# Patient Record
Sex: FEMALE | Race: WHITE | NOT HISPANIC OR LATINO | Employment: OTHER | URBAN - METROPOLITAN AREA
[De-identification: names, ages, dates, MRNs, and addresses within clinical notes are randomized per-mention and may not be internally consistent; named-entity substitution may affect disease eponyms.]

---

## 2018-01-01 ENCOUNTER — ANESTHESIA EVENT (OUTPATIENT)
Dept: SURGERY | Facility: HOSPITAL | Age: 83
DRG: 470 | End: 2018-01-01
Payer: MEDICARE

## 2018-01-01 ENCOUNTER — HOSPITAL ENCOUNTER (INPATIENT)
Facility: HOSPITAL | Age: 83
LOS: 5 days | Discharge: NURSING, REHAB OR LONG-TERM CARE | DRG: 470 | End: 2018-09-25
Attending: EMERGENCY MEDICINE | Admitting: INTERNAL MEDICINE
Payer: MEDICARE

## 2018-01-01 ENCOUNTER — ANESTHESIA (OUTPATIENT)
Dept: SURGERY | Facility: HOSPITAL | Age: 83
DRG: 470 | End: 2018-01-01
Payer: MEDICARE

## 2018-01-01 ENCOUNTER — HOSPITAL ENCOUNTER (EMERGENCY)
Facility: HOSPITAL | Age: 83
Discharge: OTHER INSTITUTION | End: 2018-06-15
Attending: EMERGENCY MEDICINE
Payer: MEDICARE

## 2018-01-01 ENCOUNTER — HOSPITAL ENCOUNTER (EMERGENCY)
Facility: HOSPITAL | Age: 83
End: 2018-11-19
Attending: EMERGENCY MEDICINE
Payer: MEDICARE

## 2018-01-01 ENCOUNTER — PATIENT OUTREACH (OUTPATIENT)
Dept: ADMINISTRATIVE | Facility: CLINIC | Age: 83
End: 2018-01-01

## 2018-01-01 VITALS
BODY MASS INDEX: 18.36 KG/M2 | RESPIRATION RATE: 17 BRPM | HEIGHT: 63 IN | DIASTOLIC BLOOD PRESSURE: 67 MMHG | HEART RATE: 66 BPM | WEIGHT: 103.63 LBS | OXYGEN SATURATION: 94 % | SYSTOLIC BLOOD PRESSURE: 148 MMHG | TEMPERATURE: 99 F

## 2018-01-01 VITALS
OXYGEN SATURATION: 99 % | HEART RATE: 86 BPM | BODY MASS INDEX: 18.54 KG/M2 | SYSTOLIC BLOOD PRESSURE: 173 MMHG | WEIGHT: 100.75 LBS | DIASTOLIC BLOOD PRESSURE: 77 MMHG | TEMPERATURE: 98 F | HEIGHT: 62 IN | RESPIRATION RATE: 16 BRPM

## 2018-01-01 VITALS
OXYGEN SATURATION: 72 % | BODY MASS INDEX: 14.8 KG/M2 | SYSTOLIC BLOOD PRESSURE: 71 MMHG | RESPIRATION RATE: 30 BRPM | WEIGHT: 83.56 LBS | DIASTOLIC BLOOD PRESSURE: 44 MMHG

## 2018-01-01 DIAGNOSIS — F02.81 ALZHEIMER'S DEMENTIA WITH BEHAVIORAL DISTURBANCE, UNSPECIFIED TIMING OF DEMENTIA ONSET: Primary | ICD-10-CM

## 2018-01-01 DIAGNOSIS — Z96.649 S/P HIP HEMIARTHROPLASTY: Primary | ICD-10-CM

## 2018-01-01 DIAGNOSIS — M54.9 BACK PAIN: ICD-10-CM

## 2018-01-01 DIAGNOSIS — R09.2 RESPIRATORY ARREST: Primary | ICD-10-CM

## 2018-01-01 DIAGNOSIS — M25.569 KNEE PAIN: ICD-10-CM

## 2018-01-01 DIAGNOSIS — I10 ESSENTIAL (PRIMARY) HYPERTENSION: ICD-10-CM
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
## 2018-01-01 DIAGNOSIS — G30.9 ALZHEIMER'S DEMENTIA WITH BEHAVIORAL DISTURBANCE, UNSPECIFIED TIMING OF DEMENTIA ONSET: Primary | ICD-10-CM

## 2018-01-01 DIAGNOSIS — I46.9 CARDIAC ARREST: ICD-10-CM

## 2018-01-01 DIAGNOSIS — S72.002A CLOSED FRACTURE OF LEFT HIP: ICD-10-CM

## 2018-01-01 DIAGNOSIS — M25.559 HIP PAIN: ICD-10-CM

## 2018-01-01 DIAGNOSIS — N39.0 CHRONIC UTI: ICD-10-CM

## 2018-01-01 DIAGNOSIS — S72.002A CLOSED FRACTURE OF LEFT HIP, INITIAL ENCOUNTER: ICD-10-CM

## 2018-01-01 DIAGNOSIS — I10 HYPERTENSION, UNSPECIFIED TYPE: ICD-10-CM

## 2018-01-01 DIAGNOSIS — T17.908A ASPIRATION INTO AIRWAY, INITIAL ENCOUNTER: ICD-10-CM

## 2018-01-01 DIAGNOSIS — R45.1 AGITATION: ICD-10-CM

## 2018-01-01 LAB
ABO + RH BLD: NORMAL
ALBUMIN SERPL BCP-MCNC: 3.7 G/DL
ALP SERPL-CCNC: 66 U/L
ALT SERPL W/O P-5'-P-CCNC: 20 U/L
AMPHET+METHAMPHET UR QL: NEGATIVE
ANION GAP SERPL CALC-SCNC: 10 MMOL/L
ANION GAP SERPL CALC-SCNC: 10 MMOL/L
ANION GAP SERPL CALC-SCNC: 11 MMOL/L
ANION GAP SERPL CALC-SCNC: 12 MMOL/L
ANION GAP SERPL CALC-SCNC: 7 MMOL/L
ANION GAP SERPL CALC-SCNC: 8 MMOL/L
APTT BLDCRRT: 25.4 SEC
AST SERPL-CCNC: 23 U/L
BARBITURATES UR QL SCN>200 NG/ML: NEGATIVE
BASOPHILS # BLD AUTO: 0.01 K/UL
BASOPHILS # BLD AUTO: 0.03 K/UL
BASOPHILS # BLD AUTO: 0.03 K/UL
BASOPHILS NFR BLD: 0.1 %
BASOPHILS NFR BLD: 0.2 %
BASOPHILS NFR BLD: 0.5 %
BENZODIAZ UR QL SCN>200 NG/ML: NEGATIVE
BILIRUB SERPL-MCNC: 0.3 MG/DL
BILIRUB UR QL STRIP: NEGATIVE
BILIRUB UR QL STRIP: NEGATIVE
BLD GP AB SCN CELLS X3 SERPL QL: NORMAL
BLD PROD TYP BPU: NORMAL
BLOOD UNIT EXPIRATION DATE: NORMAL
BLOOD UNIT TYPE CODE: 6200
BLOOD UNIT TYPE: NORMAL
BUN SERPL-MCNC: 21 MG/DL
BUN SERPL-MCNC: 22 MG/DL
BUN SERPL-MCNC: 23 MG/DL
BUN SERPL-MCNC: 24 MG/DL
BUN SERPL-MCNC: 35 MG/DL
BZE UR QL SCN: NEGATIVE
CALCIUM SERPL-MCNC: 8.1 MG/DL
CALCIUM SERPL-MCNC: 8.1 MG/DL
CALCIUM SERPL-MCNC: 8.3 MG/DL
CALCIUM SERPL-MCNC: 8.4 MG/DL
CALCIUM SERPL-MCNC: 8.5 MG/DL
CALCIUM SERPL-MCNC: 8.7 MG/DL
CALCIUM SERPL-MCNC: 9.4 MG/DL
CALCIUM SERPL-MCNC: 9.4 MG/DL
CANNABINOIDS UR QL SCN: NEGATIVE
CHLORIDE SERPL-SCNC: 100 MMOL/L
CHLORIDE SERPL-SCNC: 104 MMOL/L
CHLORIDE SERPL-SCNC: 104 MMOL/L
CHLORIDE SERPL-SCNC: 105 MMOL/L
CHLORIDE SERPL-SCNC: 106 MMOL/L
CHLORIDE SERPL-SCNC: 107 MMOL/L
CHLORIDE SERPL-SCNC: 109 MMOL/L
CHLORIDE SERPL-SCNC: 113 MMOL/L
CK SERPL-CCNC: 136 U/L
CLARITY UR: CLEAR
CLARITY UR: CLEAR
CO2 SERPL-SCNC: 18 MMOL/L
CO2 SERPL-SCNC: 19 MMOL/L
CO2 SERPL-SCNC: 19 MMOL/L
CO2 SERPL-SCNC: 20 MMOL/L
CO2 SERPL-SCNC: 21 MMOL/L
CO2 SERPL-SCNC: 23 MMOL/L
CO2 SERPL-SCNC: 23 MMOL/L
CO2 SERPL-SCNC: 24 MMOL/L
CODING SYSTEM: NORMAL
COLOR UR: YELLOW
COLOR UR: YELLOW
CREAT SERPL-MCNC: 0.6 MG/DL
CREAT SERPL-MCNC: 0.7 MG/DL
CREAT SERPL-MCNC: 0.8 MG/DL
CREAT SERPL-MCNC: 0.8 MG/DL
CREAT SERPL-MCNC: 1.3 MG/DL
CREAT UR-MCNC: 167.5 MG/DL
DIFFERENTIAL METHOD: ABNORMAL
DISPENSE STATUS: NORMAL
EOSINOPHIL # BLD AUTO: 0 K/UL
EOSINOPHIL # BLD AUTO: 0.1 K/UL
EOSINOPHIL # BLD AUTO: 0.1 K/UL
EOSINOPHIL # BLD AUTO: 0.2 K/UL
EOSINOPHIL # BLD AUTO: 0.2 K/UL
EOSINOPHIL NFR BLD: 0 %
EOSINOPHIL NFR BLD: 0.1 %
EOSINOPHIL NFR BLD: 0.2 %
EOSINOPHIL NFR BLD: 0.3 %
EOSINOPHIL NFR BLD: 0.8 %
EOSINOPHIL NFR BLD: 0.8 %
EOSINOPHIL NFR BLD: 1.5 %
EOSINOPHIL NFR BLD: 1.9 %
ERYTHROCYTE [DISTWIDTH] IN BLOOD BY AUTOMATED COUNT: 14.8 %
ERYTHROCYTE [DISTWIDTH] IN BLOOD BY AUTOMATED COUNT: 14.8 %
ERYTHROCYTE [DISTWIDTH] IN BLOOD BY AUTOMATED COUNT: 14.9 %
ERYTHROCYTE [DISTWIDTH] IN BLOOD BY AUTOMATED COUNT: 15.1 %
ERYTHROCYTE [DISTWIDTH] IN BLOOD BY AUTOMATED COUNT: 15.2 %
ERYTHROCYTE [DISTWIDTH] IN BLOOD BY AUTOMATED COUNT: 15.2 %
EST. GFR  (AFRICAN AMERICAN): 41 ML/MIN/1.73 M^2
EST. GFR  (AFRICAN AMERICAN): >60 ML/MIN/1.73 M^2
EST. GFR  (NON AFRICAN AMERICAN): 36 ML/MIN/1.73 M^2
EST. GFR  (NON AFRICAN AMERICAN): >60 ML/MIN/1.73 M^2
GLUCOSE SERPL-MCNC: 101 MG/DL
GLUCOSE SERPL-MCNC: 103 MG/DL
GLUCOSE SERPL-MCNC: 129 MG/DL
GLUCOSE SERPL-MCNC: 139 MG/DL
GLUCOSE SERPL-MCNC: 142 MG/DL
GLUCOSE SERPL-MCNC: 156 MG/DL
GLUCOSE SERPL-MCNC: 97 MG/DL
GLUCOSE SERPL-MCNC: 98 MG/DL
GLUCOSE UR QL STRIP: NEGATIVE
GLUCOSE UR QL STRIP: NEGATIVE
HCT VFR BLD AUTO: 26.8 %
HCT VFR BLD AUTO: 28.9 %
HCT VFR BLD AUTO: 30.3 %
HCT VFR BLD AUTO: 31.1 %
HCT VFR BLD AUTO: 31.7 %
HCT VFR BLD AUTO: 33.4 %
HCT VFR BLD AUTO: 37.8 %
HCT VFR BLD AUTO: 38.5 %
HGB BLD-MCNC: 10.1 G/DL
HGB BLD-MCNC: 10.3 G/DL
HGB BLD-MCNC: 10.5 G/DL
HGB BLD-MCNC: 11.1 G/DL
HGB BLD-MCNC: 12.9 G/DL
HGB BLD-MCNC: 13 G/DL
HGB BLD-MCNC: 8.9 G/DL
HGB BLD-MCNC: 9.6 G/DL
HGB UR QL STRIP: ABNORMAL
HGB UR QL STRIP: NEGATIVE
INR PPP: 1.1
KETONES UR QL STRIP: ABNORMAL
KETONES UR QL STRIP: NEGATIVE
LEUKOCYTE ESTERASE UR QL STRIP: NEGATIVE
LEUKOCYTE ESTERASE UR QL STRIP: NEGATIVE
LYMPHOCYTES # BLD AUTO: 0.9 K/UL
LYMPHOCYTES # BLD AUTO: 1 K/UL
LYMPHOCYTES # BLD AUTO: 1 K/UL
LYMPHOCYTES # BLD AUTO: 1.1 K/UL
LYMPHOCYTES # BLD AUTO: 1.2 K/UL
LYMPHOCYTES # BLD AUTO: 1.2 K/UL
LYMPHOCYTES # BLD AUTO: 1.6 K/UL
LYMPHOCYTES # BLD AUTO: 2.1 K/UL
LYMPHOCYTES NFR BLD: 12.7 %
LYMPHOCYTES NFR BLD: 34 %
LYMPHOCYTES NFR BLD: 5.5 %
LYMPHOCYTES NFR BLD: 6.5 %
LYMPHOCYTES NFR BLD: 7.5 %
LYMPHOCYTES NFR BLD: 7.7 %
LYMPHOCYTES NFR BLD: 9.3 %
LYMPHOCYTES NFR BLD: 9.9 %
MAGNESIUM SERPL-MCNC: 1.7 MG/DL
MAGNESIUM SERPL-MCNC: 1.8 MG/DL
MAGNESIUM SERPL-MCNC: 1.9 MG/DL
MCH RBC QN AUTO: 28.5 PG
MCH RBC QN AUTO: 28.6 PG
MCH RBC QN AUTO: 28.7 PG
MCH RBC QN AUTO: 28.7 PG
MCH RBC QN AUTO: 28.8 PG
MCH RBC QN AUTO: 28.9 PG
MCH RBC QN AUTO: 29.4 PG
MCH RBC QN AUTO: 30.4 PG
MCHC RBC AUTO-ENTMCNC: 33.1 G/DL
MCHC RBC AUTO-ENTMCNC: 33.1 G/DL
MCHC RBC AUTO-ENTMCNC: 33.2 G/DL
MCHC RBC AUTO-ENTMCNC: 33.3 G/DL
MCHC RBC AUTO-ENTMCNC: 33.8 G/DL
MCHC RBC AUTO-ENTMCNC: 34.1 G/DL
MCV RBC AUTO: 86 FL
MCV RBC AUTO: 87 FL
MCV RBC AUTO: 87 FL
MCV RBC AUTO: 90 FL
METHADONE UR QL SCN>300 NG/ML: NEGATIVE
MICROSCOPIC COMMENT: ABNORMAL
MONOCYTES # BLD AUTO: 0.5 K/UL
MONOCYTES # BLD AUTO: 0.7 K/UL
MONOCYTES # BLD AUTO: 0.9 K/UL
MONOCYTES # BLD AUTO: 1 K/UL
MONOCYTES # BLD AUTO: 1.1 K/UL
MONOCYTES NFR BLD: 4.6 %
MONOCYTES NFR BLD: 5 %
MONOCYTES NFR BLD: 6.9 %
MONOCYTES NFR BLD: 7 %
MONOCYTES NFR BLD: 7 %
MONOCYTES NFR BLD: 7.4 %
MONOCYTES NFR BLD: 8.5 %
MONOCYTES NFR BLD: 9.3 %
NEUTROPHILS # BLD AUTO: 10.3 K/UL
NEUTROPHILS # BLD AUTO: 10.8 K/UL
NEUTROPHILS # BLD AUTO: 11.9 K/UL
NEUTROPHILS # BLD AUTO: 12.3 K/UL
NEUTROPHILS # BLD AUTO: 16.4 K/UL
NEUTROPHILS # BLD AUTO: 3.5 K/UL
NEUTROPHILS # BLD AUTO: 9.2 K/UL
NEUTROPHILS # BLD AUTO: 9.8 K/UL
NEUTROPHILS NFR BLD: 56.2 %
NEUTROPHILS NFR BLD: 78.3 %
NEUTROPHILS NFR BLD: 78.8 %
NEUTROPHILS NFR BLD: 83.4 %
NEUTROPHILS NFR BLD: 85.2 %
NEUTROPHILS NFR BLD: 86.3 %
NEUTROPHILS NFR BLD: 86.7 %
NEUTROPHILS NFR BLD: 89.4 %
NITRITE UR QL STRIP: NEGATIVE
NITRITE UR QL STRIP: NEGATIVE
NUM UNITS TRANS PACKED RBC: NORMAL
OPIATES UR QL SCN: NEGATIVE
PCP UR QL SCN>25 NG/ML: NEGATIVE
PH UR STRIP: 6 [PH] (ref 5–8)
PH UR STRIP: 7 [PH] (ref 5–8)
PHOSPHATE SERPL-MCNC: 1.9 MG/DL
PHOSPHATE SERPL-MCNC: 1.9 MG/DL
PHOSPHATE SERPL-MCNC: 2.1 MG/DL
PHOSPHATE SERPL-MCNC: 2.4 MG/DL
PHOSPHATE SERPL-MCNC: 2.4 MG/DL
PLATELET # BLD AUTO: 197 K/UL
PLATELET # BLD AUTO: 214 K/UL
PLATELET # BLD AUTO: 221 K/UL
PLATELET # BLD AUTO: 233 K/UL
PLATELET # BLD AUTO: 236 K/UL
PLATELET # BLD AUTO: 245 K/UL
PLATELET # BLD AUTO: 267 K/UL
PLATELET # BLD AUTO: 269 K/UL
PMV BLD AUTO: 10.1 FL
PMV BLD AUTO: 10.2 FL
PMV BLD AUTO: 10.3 FL
PMV BLD AUTO: 10.4 FL
PMV BLD AUTO: 10.5 FL
PMV BLD AUTO: 9.5 FL
PMV BLD AUTO: 9.7 FL
PMV BLD AUTO: 9.8 FL
POTASSIUM SERPL-SCNC: 3.1 MMOL/L
POTASSIUM SERPL-SCNC: 3.3 MMOL/L
POTASSIUM SERPL-SCNC: 3.5 MMOL/L
POTASSIUM SERPL-SCNC: 3.7 MMOL/L
POTASSIUM SERPL-SCNC: 3.8 MMOL/L
POTASSIUM SERPL-SCNC: 3.9 MMOL/L
POTASSIUM SERPL-SCNC: 4 MMOL/L
POTASSIUM SERPL-SCNC: 4.1 MMOL/L
PROT SERPL-MCNC: 6.8 G/DL
PROT UR QL STRIP: ABNORMAL
PROT UR QL STRIP: NEGATIVE
PROTHROMBIN TIME: 10.9 SEC
RBC # BLD AUTO: 3.12 M/UL
RBC # BLD AUTO: 3.36 M/UL
RBC # BLD AUTO: 3.51 M/UL
RBC # BLD AUTO: 3.57 M/UL
RBC # BLD AUTO: 3.66 M/UL
RBC # BLD AUTO: 3.87 M/UL
RBC # BLD AUTO: 4.27 M/UL
RBC # BLD AUTO: 4.39 M/UL
RBC #/AREA URNS HPF: 5 /HPF (ref 0–4)
SALICYLATES SERPL-MCNC: <5 MG/DL
SODIUM SERPL-SCNC: 134 MMOL/L
SODIUM SERPL-SCNC: 135 MMOL/L
SODIUM SERPL-SCNC: 136 MMOL/L
SODIUM SERPL-SCNC: 138 MMOL/L
SODIUM SERPL-SCNC: 138 MMOL/L
SODIUM SERPL-SCNC: 142 MMOL/L
SP GR UR STRIP: 1.02 (ref 1–1.03)
SP GR UR STRIP: >=1.03 (ref 1–1.03)
TOXICOLOGY INFORMATION: NORMAL
TROPONIN I SERPL DL<=0.01 NG/ML-MCNC: <0.006 NG/ML
URN SPEC COLLECT METH UR: ABNORMAL
URN SPEC COLLECT METH UR: ABNORMAL
UROBILINOGEN UR STRIP-ACNC: NEGATIVE EU/DL
UROBILINOGEN UR STRIP-ACNC: NEGATIVE EU/DL
WBC # BLD AUTO: 11.67 K/UL
WBC # BLD AUTO: 12.39 K/UL
WBC # BLD AUTO: 12.49 K/UL
WBC # BLD AUTO: 12.61 K/UL
WBC # BLD AUTO: 13.79 K/UL
WBC # BLD AUTO: 14.24 K/UL
WBC # BLD AUTO: 18.39 K/UL
WBC # BLD AUTO: 6.26 K/UL

## 2018-01-01 PROCEDURE — 97530 THERAPEUTIC ACTIVITIES: CPT

## 2018-01-01 PROCEDURE — 96374 THER/PROPH/DIAG INJ IV PUSH: CPT

## 2018-01-01 PROCEDURE — 0SRS01A REPLACEMENT OF LEFT HIP JOINT, FEMORAL SURFACE WITH METAL SYNTHETIC SUBSTITUTE, UNCEMENTED, OPEN APPROACH: ICD-10-PCS | Performed by: ORTHOPAEDIC SURGERY

## 2018-01-01 PROCEDURE — 96376 TX/PRO/DX INJ SAME DRUG ADON: CPT

## 2018-01-01 PROCEDURE — 25000003 PHARM REV CODE 250: Performed by: PHYSICIAN ASSISTANT

## 2018-01-01 PROCEDURE — 80048 BASIC METABOLIC PNL TOTAL CA: CPT

## 2018-01-01 PROCEDURE — 27201423 OPTIME MED/SURG SUP & DEVICES STERILE SUPPLY: Performed by: ORTHOPAEDIC SURGERY

## 2018-01-01 PROCEDURE — 36415 COLL VENOUS BLD VENIPUNCTURE: CPT

## 2018-01-01 PROCEDURE — 21400001 HC TELEMETRY ROOM

## 2018-01-01 PROCEDURE — 94760 N-INVAS EAR/PLS OXIMETRY 1: CPT

## 2018-01-01 PROCEDURE — 83735 ASSAY OF MAGNESIUM: CPT

## 2018-01-01 PROCEDURE — 63600175 PHARM REV CODE 636 W HCPCS: Performed by: PHYSICIAN ASSISTANT

## 2018-01-01 PROCEDURE — 97802 MEDICAL NUTRITION INDIV IN: CPT

## 2018-01-01 PROCEDURE — 84100 ASSAY OF PHOSPHORUS: CPT

## 2018-01-01 PROCEDURE — 97110 THERAPEUTIC EXERCISES: CPT

## 2018-01-01 PROCEDURE — 25000003 PHARM REV CODE 250: Performed by: INTERNAL MEDICINE

## 2018-01-01 PROCEDURE — 63600175 PHARM REV CODE 636 W HCPCS: Performed by: ORTHOPAEDIC SURGERY

## 2018-01-01 PROCEDURE — C1776 JOINT DEVICE (IMPLANTABLE): HCPCS | Performed by: ORTHOPAEDIC SURGERY

## 2018-01-01 PROCEDURE — 27000221 HC OXYGEN, UP TO 24 HOURS

## 2018-01-01 PROCEDURE — 88311 DECALCIFY TISSUE: CPT | Mod: 26,,, | Performed by: PATHOLOGY

## 2018-01-01 PROCEDURE — 36000710: Performed by: ORTHOPAEDIC SURGERY

## 2018-01-01 PROCEDURE — 92610 EVALUATE SWALLOWING FUNCTION: CPT

## 2018-01-01 PROCEDURE — 85025 COMPLETE CBC W/AUTO DIFF WBC: CPT

## 2018-01-01 PROCEDURE — 97116 GAIT TRAINING THERAPY: CPT

## 2018-01-01 PROCEDURE — 25000003 PHARM REV CODE 250: Performed by: ORTHOPAEDIC SURGERY

## 2018-01-01 PROCEDURE — 96361 HYDRATE IV INFUSION ADD-ON: CPT

## 2018-01-01 PROCEDURE — G8979 MOBILITY GOAL STATUS: HCPCS | Mod: CK

## 2018-01-01 PROCEDURE — 99285 EMERGENCY DEPT VISIT HI MDM: CPT | Mod: 25

## 2018-01-01 PROCEDURE — 85730 THROMBOPLASTIN TIME PARTIAL: CPT

## 2018-01-01 PROCEDURE — 84484 ASSAY OF TROPONIN QUANT: CPT

## 2018-01-01 PROCEDURE — 80307 DRUG TEST PRSMV CHEM ANLYZR: CPT

## 2018-01-01 PROCEDURE — 93010 ELECTROCARDIOGRAM REPORT: CPT | Mod: ,,, | Performed by: INTERNAL MEDICINE

## 2018-01-01 PROCEDURE — 63600175 PHARM REV CODE 636 W HCPCS: Performed by: EMERGENCY MEDICINE

## 2018-01-01 PROCEDURE — 97535 SELF CARE MNGMENT TRAINING: CPT

## 2018-01-01 PROCEDURE — 25000003 PHARM REV CODE 250: Performed by: NURSE PRACTITIONER

## 2018-01-01 PROCEDURE — A4216 STERILE WATER/SALINE, 10 ML: HCPCS | Performed by: ANESTHESIOLOGY

## 2018-01-01 PROCEDURE — 63600175 PHARM REV CODE 636 W HCPCS: Performed by: NURSE PRACTITIONER

## 2018-01-01 PROCEDURE — 81003 URINALYSIS AUTO W/O SCOPE: CPT | Mod: 59

## 2018-01-01 PROCEDURE — 96375 TX/PRO/DX INJ NEW DRUG ADDON: CPT

## 2018-01-01 PROCEDURE — 25000003 PHARM REV CODE 250: Performed by: ANESTHESIOLOGY

## 2018-01-01 PROCEDURE — 97162 PT EVAL MOD COMPLEX 30 MIN: CPT

## 2018-01-01 PROCEDURE — 81000 URINALYSIS NONAUTO W/SCOPE: CPT

## 2018-01-01 PROCEDURE — 37000008 HC ANESTHESIA 1ST 15 MINUTES: Performed by: ORTHOPAEDIC SURGERY

## 2018-01-01 PROCEDURE — 94761 N-INVAS EAR/PLS OXIMETRY MLT: CPT

## 2018-01-01 PROCEDURE — 25000003 PHARM REV CODE 250: Performed by: NURSE ANESTHETIST, CERTIFIED REGISTERED

## 2018-01-01 PROCEDURE — 36000711: Performed by: ORTHOPAEDIC SURGERY

## 2018-01-01 PROCEDURE — 63600175 PHARM REV CODE 636 W HCPCS: Performed by: NURSE ANESTHETIST, CERTIFIED REGISTERED

## 2018-01-01 PROCEDURE — 99283 EMERGENCY DEPT VISIT LOW MDM: CPT

## 2018-01-01 PROCEDURE — 97167 OT EVAL HIGH COMPLEX 60 MIN: CPT

## 2018-01-01 PROCEDURE — 97803 MED NUTRITION INDIV SUBSEQ: CPT

## 2018-01-01 PROCEDURE — 36430 TRANSFUSION BLD/BLD COMPNT: CPT

## 2018-01-01 PROCEDURE — 88305 TISSUE EXAM BY PATHOLOGIST: CPT | Mod: 26,,, | Performed by: PATHOLOGY

## 2018-01-01 PROCEDURE — 86850 RBC ANTIBODY SCREEN: CPT

## 2018-01-01 PROCEDURE — G8988 SELF CARE GOAL STATUS: HCPCS | Mod: CK

## 2018-01-01 PROCEDURE — 85610 PROTHROMBIN TIME: CPT

## 2018-01-01 PROCEDURE — 37000009 HC ANESTHESIA EA ADD 15 MINS: Performed by: ORTHOPAEDIC SURGERY

## 2018-01-01 PROCEDURE — 71000033 HC RECOVERY, INTIAL HOUR: Performed by: ORTHOPAEDIC SURGERY

## 2018-01-01 PROCEDURE — G8978 MOBILITY CURRENT STATUS: HCPCS | Mod: CL

## 2018-01-01 PROCEDURE — 82550 ASSAY OF CK (CPK): CPT

## 2018-01-01 PROCEDURE — 86920 COMPATIBILITY TEST SPIN: CPT

## 2018-01-01 PROCEDURE — 99900038 HC OT GENERIC THERAPY SCREENING (STAT)

## 2018-01-01 PROCEDURE — P9016 RBC LEUKOCYTES REDUCED: HCPCS

## 2018-01-01 PROCEDURE — 92526 ORAL FUNCTION THERAPY: CPT

## 2018-01-01 PROCEDURE — 80053 COMPREHEN METABOLIC PANEL: CPT

## 2018-01-01 PROCEDURE — 88311 DECALCIFY TISSUE: CPT | Performed by: PATHOLOGY

## 2018-01-01 PROCEDURE — G8987 SELF CARE CURRENT STATUS: HCPCS | Mod: CM

## 2018-01-01 PROCEDURE — C9290 INJ, BUPIVACAINE LIPOSOME: HCPCS | Performed by: ORTHOPAEDIC SURGERY

## 2018-01-01 RX ORDER — BUPIVACAINE HYDROCHLORIDE AND EPINEPHRINE 2.5; 5 MG/ML; UG/ML
INJECTION, SOLUTION EPIDURAL; INFILTRATION; INTRACAUDAL; PERINEURAL
Status: DISCONTINUED | OUTPATIENT
Start: 2018-01-01 | End: 2018-01-01 | Stop reason: HOSPADM

## 2018-01-01 RX ORDER — CEPHALEXIN 250 MG/1
250 CAPSULE ORAL DAILY
Status: DISCONTINUED | OUTPATIENT
Start: 2018-01-01 | End: 2018-01-01

## 2018-01-01 RX ORDER — ROCURONIUM BROMIDE 10 MG/ML
INJECTION, SOLUTION INTRAVENOUS
Status: DISCONTINUED | OUTPATIENT
Start: 2018-01-01 | End: 2018-01-01

## 2018-01-01 RX ORDER — VANCOMYCIN HYDROCHLORIDE 1 G/20ML
INJECTION, POWDER, LYOPHILIZED, FOR SOLUTION INTRAVENOUS
Status: DISCONTINUED | OUTPATIENT
Start: 2018-01-01 | End: 2018-01-01 | Stop reason: HOSPADM

## 2018-01-01 RX ORDER — CETIRIZINE HYDROCHLORIDE 10 MG/1
10 TABLET ORAL DAILY
Status: DISCONTINUED | OUTPATIENT
Start: 2018-01-01 | End: 2018-01-01 | Stop reason: HOSPADM

## 2018-01-01 RX ORDER — MORPHINE SULFATE 4 MG/ML
2 INJECTION, SOLUTION INTRAMUSCULAR; INTRAVENOUS EVERY 4 HOURS PRN
Status: DISCONTINUED | OUTPATIENT
Start: 2018-01-01 | End: 2018-01-01

## 2018-01-01 RX ORDER — MORPHINE SULFATE 4 MG/ML
2 INJECTION, SOLUTION INTRAMUSCULAR; INTRAVENOUS
Status: COMPLETED | OUTPATIENT
Start: 2018-01-01 | End: 2018-01-01

## 2018-01-01 RX ORDER — SUCCINYLCHOLINE CHLORIDE 20 MG/ML
INJECTION INTRAMUSCULAR; INTRAVENOUS
Status: DISCONTINUED | OUTPATIENT
Start: 2018-01-01 | End: 2018-01-01

## 2018-01-01 RX ORDER — MORPHINE SULFATE 4 MG/ML
2 INJECTION, SOLUTION INTRAMUSCULAR; INTRAVENOUS EVERY 6 HOURS PRN
Status: DISCONTINUED | OUTPATIENT
Start: 2018-01-01 | End: 2018-01-01 | Stop reason: HOSPADM

## 2018-01-01 RX ORDER — TRANEXAMIC ACID 100 MG/ML
1000 INJECTION, SOLUTION INTRAVENOUS ONCE
Status: COMPLETED | OUTPATIENT
Start: 2018-01-01 | End: 2018-01-01

## 2018-01-01 RX ORDER — DORZOLAMIDE HYDROCHLORIDE AND TIMOLOL MALEATE 20; 5 MG/ML; MG/ML
1 SOLUTION/ DROPS OPHTHALMIC 2 TIMES DAILY
COMMUNITY
End: 2018-01-01

## 2018-01-01 RX ORDER — SODIUM CHLORIDE 0.9 % (FLUSH) 0.9 %
5 SYRINGE (ML) INJECTION
Status: DISCONTINUED | OUTPATIENT
Start: 2018-01-01 | End: 2018-01-01

## 2018-01-01 RX ORDER — SODIUM,POTASSIUM PHOSPHATES 280-250MG
1 POWDER IN PACKET (EA) ORAL
Status: DISCONTINUED | OUTPATIENT
Start: 2018-01-01 | End: 2018-01-01 | Stop reason: HOSPADM

## 2018-01-01 RX ORDER — ASPIRIN 325 MG
325 TABLET ORAL 2 TIMES DAILY
Status: DISCONTINUED | OUTPATIENT
Start: 2018-01-01 | End: 2018-01-01 | Stop reason: HOSPADM

## 2018-01-01 RX ORDER — MEPERIDINE HYDROCHLORIDE 50 MG/ML
12.5 INJECTION INTRAMUSCULAR; INTRAVENOUS; SUBCUTANEOUS ONCE AS NEEDED
Status: DISCONTINUED | OUTPATIENT
Start: 2018-01-01 | End: 2018-01-01

## 2018-01-01 RX ORDER — CHLORHEXIDINE GLUCONATE ORAL RINSE 1.2 MG/ML
10 SOLUTION DENTAL 2 TIMES DAILY
Status: DISCONTINUED | OUTPATIENT
Start: 2018-01-01 | End: 2018-01-01 | Stop reason: HOSPADM

## 2018-01-01 RX ORDER — LATANOPROST 50 UG/ML
1 SOLUTION/ DROPS OPHTHALMIC NIGHTLY
COMMUNITY

## 2018-01-01 RX ORDER — NEOMYCIN AND POLYMYXIN B SULFATES 40; 200000 MG/ML; [USP'U]/ML
SOLUTION IRRIGATION
Status: DISCONTINUED | OUTPATIENT
Start: 2018-01-01 | End: 2018-01-01 | Stop reason: HOSPADM

## 2018-01-01 RX ORDER — LOSARTAN POTASSIUM AND HYDROCHLOROTHIAZIDE 12.5; 5 MG/1; MG/1
1 TABLET ORAL DAILY
COMMUNITY

## 2018-01-01 RX ORDER — POTASSIUM CHLORIDE 20 MEQ/1
20 TABLET, EXTENDED RELEASE ORAL ONCE
Status: COMPLETED | OUTPATIENT
Start: 2018-01-01 | End: 2018-01-01

## 2018-01-01 RX ORDER — POTASSIUM CHLORIDE 20 MEQ/1
40 TABLET, EXTENDED RELEASE ORAL EVERY 4 HOURS
Status: DISCONTINUED | OUTPATIENT
Start: 2018-01-01 | End: 2018-01-01

## 2018-01-01 RX ORDER — ONDANSETRON 8 MG/1
8 TABLET, ORALLY DISINTEGRATING ORAL EVERY 8 HOURS PRN
Status: DISCONTINUED | OUTPATIENT
Start: 2018-01-01 | End: 2018-01-01 | Stop reason: HOSPADM

## 2018-01-01 RX ORDER — NAPROXEN SODIUM 220 MG/1
81 TABLET, FILM COATED ORAL DAILY
Status: ON HOLD | COMMUNITY
End: 2018-01-01 | Stop reason: HOSPADM

## 2018-01-01 RX ORDER — METOPROLOL TARTRATE 50 MG/1
50 TABLET ORAL 2 TIMES DAILY
COMMUNITY
End: 2018-01-01 | Stop reason: CLARIF

## 2018-01-01 RX ORDER — MIRTAZAPINE 15 MG/1
15 TABLET, FILM COATED ORAL NIGHTLY
COMMUNITY

## 2018-01-01 RX ORDER — BACITRACIN 50000 [IU]/1
INJECTION, POWDER, FOR SOLUTION INTRAMUSCULAR
Status: DISCONTINUED | OUTPATIENT
Start: 2018-01-01 | End: 2018-01-01 | Stop reason: HOSPADM

## 2018-01-01 RX ORDER — SODIUM CHLORIDE 9 MG/ML
INJECTION, SOLUTION INTRAVENOUS CONTINUOUS
Status: DISCONTINUED | OUTPATIENT
Start: 2018-01-01 | End: 2018-01-01 | Stop reason: HOSPADM

## 2018-01-01 RX ORDER — SODIUM CHLORIDE, SODIUM LACTATE, POTASSIUM CHLORIDE, CALCIUM CHLORIDE 600; 310; 30; 20 MG/100ML; MG/100ML; MG/100ML; MG/100ML
INJECTION, SOLUTION INTRAVENOUS CONTINUOUS
Status: ACTIVE | OUTPATIENT
Start: 2018-01-01 | End: 2018-01-01

## 2018-01-01 RX ORDER — DILTIAZEM HYDROCHLORIDE 120 MG/1
240 CAPSULE, COATED, EXTENDED RELEASE ORAL NIGHTLY
Status: DISCONTINUED | OUTPATIENT
Start: 2018-01-01 | End: 2018-01-01 | Stop reason: HOSPADM

## 2018-01-01 RX ORDER — METOPROLOL TARTRATE 25 MG/1
25 TABLET, FILM COATED ORAL 2 TIMES DAILY
COMMUNITY

## 2018-01-01 RX ORDER — RISPERIDONE 0.25 MG/1
0.25 TABLET ORAL 2 TIMES DAILY
COMMUNITY

## 2018-01-01 RX ORDER — LIDOCAINE HYDROCHLORIDE 10 MG/ML
INJECTION INFILTRATION; PERINEURAL
Status: DISCONTINUED | OUTPATIENT
Start: 2018-01-01 | End: 2018-01-01

## 2018-01-01 RX ORDER — METOPROLOL TARTRATE 25 MG/1
25 TABLET, FILM COATED ORAL 2 TIMES DAILY
Status: DISCONTINUED | OUTPATIENT
Start: 2018-01-01 | End: 2018-01-01 | Stop reason: HOSPADM

## 2018-01-01 RX ORDER — LORATADINE 10 MG/1
10 TABLET ORAL DAILY
COMMUNITY

## 2018-01-01 RX ORDER — SODIUM CHLORIDE, SODIUM LACTATE, POTASSIUM CHLORIDE, CALCIUM CHLORIDE 600; 310; 30; 20 MG/100ML; MG/100ML; MG/100ML; MG/100ML
INJECTION, SOLUTION INTRAVENOUS CONTINUOUS PRN
Status: DISCONTINUED | OUTPATIENT
Start: 2018-01-01 | End: 2018-01-01

## 2018-01-01 RX ORDER — FENTANYL CITRATE 50 UG/ML
INJECTION, SOLUTION INTRAMUSCULAR; INTRAVENOUS
Status: DISCONTINUED | OUTPATIENT
Start: 2018-01-01 | End: 2018-01-01

## 2018-01-01 RX ORDER — MAGNESIUM GLUCONATE 27 MG(500)
TABLET ORAL
COMMUNITY

## 2018-01-01 RX ORDER — LOSARTAN POTASSIUM 50 MG/1
50 TABLET ORAL DAILY
Status: DISCONTINUED | OUTPATIENT
Start: 2018-01-01 | End: 2018-01-01 | Stop reason: HOSPADM

## 2018-01-01 RX ORDER — HYDROCHLOROTHIAZIDE 12.5 MG/1
12.5 TABLET ORAL DAILY
COMMUNITY
End: 2018-01-01 | Stop reason: CLARIF

## 2018-01-01 RX ORDER — MORPHINE SULFATE 4 MG/ML
2 INJECTION, SOLUTION INTRAMUSCULAR; INTRAVENOUS EVERY 5 MIN PRN
Status: DISCONTINUED | OUTPATIENT
Start: 2018-01-01 | End: 2018-01-01

## 2018-01-01 RX ORDER — NEOSTIGMINE METHYLSULFATE 1 MG/ML
INJECTION, SOLUTION INTRAVENOUS
Status: DISCONTINUED | OUTPATIENT
Start: 2018-01-01 | End: 2018-01-01

## 2018-01-01 RX ORDER — DIPHENHYDRAMINE HYDROCHLORIDE 50 MG/ML
12.5 INJECTION INTRAMUSCULAR; INTRAVENOUS ONCE
Status: COMPLETED | OUTPATIENT
Start: 2018-01-01 | End: 2018-01-01

## 2018-01-01 RX ORDER — ONDANSETRON 2 MG/ML
4 INJECTION INTRAMUSCULAR; INTRAVENOUS
Status: COMPLETED | OUTPATIENT
Start: 2018-01-01 | End: 2018-01-01

## 2018-01-01 RX ORDER — MEGESTROL ACETATE 20 MG/1
20 TABLET ORAL DAILY
COMMUNITY

## 2018-01-01 RX ORDER — DILTIAZEM HYDROCHLORIDE 240 MG/1
240 CAPSULE, COATED, EXTENDED RELEASE ORAL NIGHTLY
COMMUNITY

## 2018-01-01 RX ORDER — CITALOPRAM 10 MG/1
10 TABLET ORAL 2 TIMES DAILY
Status: DISCONTINUED | OUTPATIENT
Start: 2018-01-01 | End: 2018-01-01 | Stop reason: HOSPADM

## 2018-01-01 RX ORDER — ONDANSETRON 2 MG/ML
4 INJECTION INTRAMUSCULAR; INTRAVENOUS EVERY 8 HOURS PRN
Status: DISCONTINUED | OUTPATIENT
Start: 2018-01-01 | End: 2018-01-01

## 2018-01-01 RX ORDER — CEPHALEXIN 250 MG/5ML
250 POWDER, FOR SUSPENSION ORAL DAILY
Status: DISCONTINUED | OUTPATIENT
Start: 2018-01-01 | End: 2018-01-01 | Stop reason: HOSPADM

## 2018-01-01 RX ORDER — ASPIRIN 325 MG
325 TABLET ORAL 2 TIMES DAILY
Refills: 0 | COMMUNITY
Start: 2018-01-01 | End: 2019-09-25

## 2018-01-01 RX ORDER — POLYETHYLENE GLYCOL 3350 17 G/17G
17 POWDER, FOR SOLUTION ORAL 2 TIMES DAILY
COMMUNITY

## 2018-01-01 RX ORDER — PROPOFOL 10 MG/ML
VIAL (ML) INTRAVENOUS
Status: DISCONTINUED | OUTPATIENT
Start: 2018-01-01 | End: 2018-01-01

## 2018-01-01 RX ORDER — CEPHALEXIN 250 MG/1
250 CAPSULE ORAL DAILY
COMMUNITY

## 2018-01-01 RX ORDER — METOCLOPRAMIDE HYDROCHLORIDE 5 MG/ML
10 INJECTION INTRAMUSCULAR; INTRAVENOUS EVERY 10 MIN PRN
Status: DISCONTINUED | OUTPATIENT
Start: 2018-01-01 | End: 2018-01-01

## 2018-01-01 RX ORDER — HYDROCODONE BITARTRATE AND ACETAMINOPHEN 500; 5 MG/1; MG/1
TABLET ORAL
Status: DISCONTINUED | OUTPATIENT
Start: 2018-01-01 | End: 2018-01-01 | Stop reason: HOSPADM

## 2018-01-01 RX ORDER — CITALOPRAM 10 MG/1
10 TABLET ORAL 2 TIMES DAILY
COMMUNITY

## 2018-01-01 RX ORDER — LATANOPROST 50 UG/ML
1 SOLUTION/ DROPS OPHTHALMIC NIGHTLY
Status: DISCONTINUED | OUTPATIENT
Start: 2018-01-01 | End: 2018-01-01 | Stop reason: HOSPADM

## 2018-01-01 RX ORDER — LOSARTAN POTASSIUM 100 MG/1
100 TABLET ORAL DAILY
COMMUNITY
End: 2018-01-01 | Stop reason: CLARIF

## 2018-01-01 RX ORDER — ONDANSETRON 2 MG/ML
INJECTION INTRAMUSCULAR; INTRAVENOUS
Status: DISCONTINUED | OUTPATIENT
Start: 2018-01-01 | End: 2018-01-01

## 2018-01-01 RX ORDER — CEFTRIAXONE 1 G/1
1 INJECTION, POWDER, FOR SOLUTION INTRAMUSCULAR; INTRAVENOUS
Status: COMPLETED | OUTPATIENT
Start: 2018-01-01 | End: 2018-01-01

## 2018-01-01 RX ORDER — ACETAMINOPHEN 500 MG
1000 TABLET ORAL 3 TIMES DAILY PRN
COMMUNITY

## 2018-01-01 RX ORDER — SODIUM CHLORIDE 0.9 % (FLUSH) 0.9 %
3 SYRINGE (ML) INJECTION EVERY 8 HOURS
Status: DISCONTINUED | OUTPATIENT
Start: 2018-01-01 | End: 2018-01-01

## 2018-01-01 RX ORDER — RISPERIDONE 0.25 MG/1
0.25 TABLET ORAL 2 TIMES DAILY
Status: DISCONTINUED | OUTPATIENT
Start: 2018-01-01 | End: 2018-01-01 | Stop reason: HOSPADM

## 2018-01-01 RX ORDER — CITALOPRAM 10 MG/1
10 TABLET ORAL DAILY
COMMUNITY
End: 2018-01-01

## 2018-01-01 RX ORDER — FLUCONAZOLE 100 MG/1
100 TABLET ORAL DAILY
COMMUNITY

## 2018-01-01 RX ORDER — GLYCOPYRROLATE 0.2 MG/ML
INJECTION INTRAMUSCULAR; INTRAVENOUS
Status: DISCONTINUED | OUTPATIENT
Start: 2018-01-01 | End: 2018-01-01

## 2018-01-01 RX ORDER — POTASSIUM CHLORIDE 20 MEQ/15ML
40 SOLUTION ORAL EVERY 4 HOURS
Status: COMPLETED | OUTPATIENT
Start: 2018-01-01 | End: 2018-01-01

## 2018-01-01 RX ORDER — SODIUM CHLORIDE 0.9 % (FLUSH) 0.9 %
3 SYRINGE (ML) INJECTION
Status: DISCONTINUED | OUTPATIENT
Start: 2018-01-01 | End: 2018-01-01 | Stop reason: HOSPADM

## 2018-01-01 RX ORDER — DONEPEZIL HYDROCHLORIDE 5 MG/1
5 TABLET, FILM COATED ORAL NIGHTLY
COMMUNITY
End: 2018-01-01

## 2018-01-01 RX ORDER — HYDROCODONE BITARTRATE AND ACETAMINOPHEN 5; 325 MG/1; MG/1
1 TABLET ORAL EVERY 6 HOURS PRN
Status: DISCONTINUED | OUTPATIENT
Start: 2018-01-01 | End: 2018-01-01 | Stop reason: HOSPADM

## 2018-01-01 RX ORDER — CEFAZOLIN SODIUM 1 G/50ML
1 SOLUTION INTRAVENOUS
Status: COMPLETED | OUTPATIENT
Start: 2018-01-01 | End: 2018-01-01

## 2018-01-01 RX ORDER — SODIUM CHLORIDE 9 MG/ML
INJECTION, SOLUTION INTRAVENOUS CONTINUOUS
Status: DISCONTINUED | OUTPATIENT
Start: 2018-01-01 | End: 2018-01-01

## 2018-01-01 RX ORDER — HYDRALAZINE HYDROCHLORIDE 20 MG/ML
10 INJECTION INTRAMUSCULAR; INTRAVENOUS EVERY 6 HOURS PRN
Status: DISCONTINUED | OUTPATIENT
Start: 2018-01-01 | End: 2018-01-01 | Stop reason: HOSPADM

## 2018-01-01 RX ORDER — OXYCODONE HYDROCHLORIDE 5 MG/1
5 TABLET ORAL
Status: DISCONTINUED | OUTPATIENT
Start: 2018-01-01 | End: 2018-01-01

## 2018-01-01 RX ORDER — ACETAMINOPHEN 325 MG/1
650 TABLET ORAL EVERY 6 HOURS PRN
Status: DISCONTINUED | OUTPATIENT
Start: 2018-01-01 | End: 2018-01-01 | Stop reason: HOSPADM

## 2018-01-01 RX ORDER — RISPERIDONE 1 MG/ML
0.25 SOLUTION ORAL 2 TIMES DAILY
Status: DISCONTINUED | OUTPATIENT
Start: 2018-01-01 | End: 2018-01-01

## 2018-01-01 RX ADMIN — MORPHINE SULFATE 2 MG: 4 INJECTION INTRAVENOUS at 04:09

## 2018-01-01 RX ADMIN — SODIUM CHLORIDE: 0.9 INJECTION, SOLUTION INTRAVENOUS at 04:09

## 2018-01-01 RX ADMIN — POTASSIUM & SODIUM PHOSPHATES POWDER PACK 280-160-250 MG 1 PACKET: 280-160-250 PACK at 11:09

## 2018-01-01 RX ADMIN — MORPHINE SULFATE 2 MG: 4 INJECTION INTRAVENOUS at 11:09

## 2018-01-01 RX ADMIN — MORPHINE SULFATE 2 MG: 4 INJECTION INTRAVENOUS at 01:09

## 2018-01-01 RX ADMIN — LIDOCAINE HYDROCHLORIDE 2 ML: 10 INJECTION, SOLUTION INFILTRATION; PERINEURAL at 07:09

## 2018-01-01 RX ADMIN — METOPROLOL TARTRATE 25 MG: 25 TABLET, FILM COATED ORAL at 09:09

## 2018-01-01 RX ADMIN — CITALOPRAM HYDROBROMIDE 10 MG: 10 TABLET ORAL at 09:09

## 2018-01-01 RX ADMIN — TRANEXAMIC ACID 1000 MG: 100 INJECTION, SOLUTION INTRAVENOUS at 07:09

## 2018-01-01 RX ADMIN — METOPROLOL TARTRATE 25 MG: 25 TABLET, FILM COATED ORAL at 08:09

## 2018-01-01 RX ADMIN — CEFAZOLIN SODIUM 1 G: 1 SOLUTION INTRAVENOUS at 11:09

## 2018-01-01 RX ADMIN — RISPERIDONE 0.25 MG: 0.25 TABLET ORAL at 09:09

## 2018-01-01 RX ADMIN — SUCCINYLCHOLINE CHLORIDE 120 MG: 20 INJECTION, SOLUTION INTRAMUSCULAR; INTRAVENOUS at 07:09

## 2018-01-01 RX ADMIN — POTASSIUM & SODIUM PHOSPHATES POWDER PACK 280-160-250 MG 1 PACKET: 280-160-250 PACK at 12:09

## 2018-01-01 RX ADMIN — ONDANSETRON 4 MG: 2 INJECTION INTRAMUSCULAR; INTRAVENOUS at 08:09

## 2018-01-01 RX ADMIN — DILTIAZEM HYDROCHLORIDE 240 MG: 120 CAPSULE, COATED, EXTENDED RELEASE ORAL at 09:09

## 2018-01-01 RX ADMIN — MORPHINE SULFATE 2 MG: 4 INJECTION INTRAVENOUS at 08:09

## 2018-01-01 RX ADMIN — METOPROLOL TARTRATE 25 MG: 25 TABLET, FILM COATED ORAL at 10:09

## 2018-01-01 RX ADMIN — POTASSIUM CHLORIDE 40 MEQ: 20 SOLUTION ORAL at 09:09

## 2018-01-01 RX ADMIN — PROPOFOL 50 MG: 10 INJECTION, EMULSION INTRAVENOUS at 09:09

## 2018-01-01 RX ADMIN — ONDANSETRON 4 MG: 2 INJECTION INTRAMUSCULAR; INTRAVENOUS at 01:09

## 2018-01-01 RX ADMIN — FENTANYL CITRATE 25 MCG: 50 INJECTION, SOLUTION INTRAMUSCULAR; INTRAVENOUS at 08:09

## 2018-01-01 RX ADMIN — LOSARTAN POTASSIUM 50 MG: 50 TABLET, FILM COATED ORAL at 10:09

## 2018-01-01 RX ADMIN — SODIUM CHLORIDE, SODIUM LACTATE, POTASSIUM CHLORIDE, AND CALCIUM CHLORIDE: 600; 310; 30; 20 INJECTION, SOLUTION INTRAVENOUS at 07:09

## 2018-01-01 RX ADMIN — ASPIRIN 325 MG ORAL TABLET 325 MG: 325 PILL ORAL at 10:09

## 2018-01-01 RX ADMIN — HYDRALAZINE HYDROCHLORIDE 10 MG: 20 INJECTION, SOLUTION INTRAMUSCULAR; INTRAVENOUS at 06:09

## 2018-01-01 RX ADMIN — RISPERIDONE 0.25 MG: 0.25 TABLET ORAL at 10:09

## 2018-01-01 RX ADMIN — CHLORHEXIDINE GLUCONATE 10 ML: 1.2 RINSE ORAL at 10:09

## 2018-01-01 RX ADMIN — DIPHENHYDRAMINE HYDROCHLORIDE 12.5 MG: 50 INJECTION, SOLUTION INTRAMUSCULAR; INTRAVENOUS at 10:09

## 2018-01-01 RX ADMIN — POTASSIUM & SODIUM PHOSPHATES POWDER PACK 280-160-250 MG 1 PACKET: 280-160-250 PACK at 10:09

## 2018-01-01 RX ADMIN — CEFTRIAXONE SODIUM 1 G: 1 INJECTION, POWDER, FOR SOLUTION INTRAMUSCULAR; INTRAVENOUS at 02:06

## 2018-01-01 RX ADMIN — LOSARTAN POTASSIUM 50 MG: 50 TABLET, FILM COATED ORAL at 09:09

## 2018-01-01 RX ADMIN — VANCOMYCIN HYDROCHLORIDE 1 G: 1 INJECTION, POWDER, LYOPHILIZED, FOR SOLUTION INTRAVENOUS at 07:09

## 2018-01-01 RX ADMIN — NEOSTIGMINE METHYLSULFATE 3 MG: 1 INJECTION INTRAVENOUS at 09:09

## 2018-01-01 RX ADMIN — ROBINUL 0.6 MG: 0.2 INJECTION INTRAMUSCULAR; INTRAVENOUS at 09:09

## 2018-01-01 RX ADMIN — CETIRIZINE HYDROCHLORIDE 10 MG: 10 TABLET, FILM COATED ORAL at 09:09

## 2018-01-01 RX ADMIN — ONDANSETRON 4 MG: 2 INJECTION, SOLUTION INTRAMUSCULAR; INTRAVENOUS at 08:09

## 2018-01-01 RX ADMIN — Medication 3 ML: at 11:09

## 2018-01-01 RX ADMIN — PROPOFOL 100 MG: 10 INJECTION, EMULSION INTRAVENOUS at 07:09

## 2018-01-01 RX ADMIN — LOSARTAN POTASSIUM 50 MG: 50 TABLET, FILM COATED ORAL at 08:09

## 2018-01-01 RX ADMIN — ASPIRIN 325 MG ORAL TABLET 325 MG: 325 PILL ORAL at 11:09

## 2018-01-01 RX ADMIN — RISPERIDONE 0.25 MG: 0.25 TABLET ORAL at 08:09

## 2018-01-01 RX ADMIN — LATANOPROST 1 DROP: 50 SOLUTION OPHTHALMIC at 09:09

## 2018-01-01 RX ADMIN — FENTANYL CITRATE 25 MCG: 50 INJECTION, SOLUTION INTRAMUSCULAR; INTRAVENOUS at 09:09

## 2018-01-01 RX ADMIN — POTASSIUM & SODIUM PHOSPHATES POWDER PACK 280-160-250 MG 1 PACKET: 280-160-250 PACK at 05:09

## 2018-01-01 RX ADMIN — SODIUM CHLORIDE: 0.9 INJECTION, SOLUTION INTRAVENOUS at 12:09

## 2018-01-01 RX ADMIN — DIPHENHYDRAMINE HYDROCHLORIDE 12.5 MG: 50 INJECTION, SOLUTION INTRAMUSCULAR; INTRAVENOUS at 02:09

## 2018-01-01 RX ADMIN — CEPHALEXIN 250 MG: 250 POWDER, FOR SUSPENSION ORAL at 08:09

## 2018-01-01 RX ADMIN — LORAZEPAM 1 MG: 2 INJECTION INTRAMUSCULAR; INTRAVENOUS at 03:06

## 2018-01-01 RX ADMIN — ASPIRIN 325 MG ORAL TABLET 325 MG: 325 PILL ORAL at 09:09

## 2018-01-01 RX ADMIN — CEPHALEXIN 250 MG: 250 POWDER, FOR SUSPENSION ORAL at 09:09

## 2018-01-01 RX ADMIN — SODIUM CHLORIDE 3 ML: 9 INJECTION, SOLUTION INTRAMUSCULAR; INTRAVENOUS; SUBCUTANEOUS at 10:09

## 2018-01-01 RX ADMIN — LATANOPROST 1 DROP: 50 SOLUTION OPHTHALMIC at 11:09

## 2018-01-01 RX ADMIN — RISPERIDONE 0.25 MG: 0.25 TABLET ORAL at 05:09

## 2018-01-01 RX ADMIN — ACETAMINOPHEN 650 MG: 325 TABLET ORAL at 02:09

## 2018-01-01 RX ADMIN — SODIUM CHLORIDE: 0.9 INJECTION, SOLUTION INTRAVENOUS at 06:09

## 2018-01-01 RX ADMIN — RISPERIDONE 0.25 MG: 0.25 TABLET ORAL at 11:09

## 2018-01-01 RX ADMIN — CEPHALEXIN 250 MG: 250 POWDER, FOR SUSPENSION ORAL at 11:09

## 2018-01-01 RX ADMIN — POTASSIUM CHLORIDE 20 MEQ: 1500 TABLET, EXTENDED RELEASE ORAL at 10:09

## 2018-01-01 RX ADMIN — POTASSIUM & SODIUM PHOSPHATES POWDER PACK 280-160-250 MG 1 PACKET: 280-160-250 PACK at 03:09

## 2018-01-01 RX ADMIN — POTASSIUM & SODIUM PHOSPHATES POWDER PACK 280-160-250 MG 1 PACKET: 280-160-250 PACK at 09:09

## 2018-01-01 RX ADMIN — DILTIAZEM HYDROCHLORIDE 240 MG: 120 CAPSULE, COATED, EXTENDED RELEASE ORAL at 10:09

## 2018-01-01 RX ADMIN — FENTANYL CITRATE 25 MCG: 50 INJECTION, SOLUTION INTRAMUSCULAR; INTRAVENOUS at 07:09

## 2018-01-01 RX ADMIN — POTASSIUM CHLORIDE 40 MEQ: 20 SOLUTION ORAL at 01:09

## 2018-01-01 RX ADMIN — LATANOPROST 1 DROP: 50 SOLUTION OPHTHALMIC at 10:09

## 2018-01-01 RX ADMIN — CETIRIZINE HYDROCHLORIDE 10 MG: 10 TABLET, FILM COATED ORAL at 10:09

## 2018-01-01 RX ADMIN — SODIUM CHLORIDE: 0.9 INJECTION, SOLUTION INTRAVENOUS at 02:09

## 2018-01-01 RX ADMIN — ROCURONIUM BROMIDE 20 MG: 10 INJECTION, SOLUTION INTRAVENOUS at 07:09

## 2018-01-01 RX ADMIN — CITALOPRAM HYDROBROMIDE 10 MG: 10 TABLET ORAL at 10:09

## 2018-01-01 RX ADMIN — SODIUM CHLORIDE, SODIUM LACTATE, POTASSIUM CHLORIDE, AND CALCIUM CHLORIDE: .6; .31; .03; .02 INJECTION, SOLUTION INTRAVENOUS at 11:09

## 2018-01-01 RX ADMIN — POTASSIUM CHLORIDE 40 MEQ: 20 SOLUTION ORAL at 05:09

## 2018-01-01 RX ADMIN — BUPIVACAINE 266 MG: 13.3 INJECTION, SUSPENSION, LIPOSOMAL INFILTRATION at 08:09

## 2018-01-01 RX ADMIN — ASPIRIN 325 MG ORAL TABLET 325 MG: 325 PILL ORAL at 08:09

## 2018-01-01 RX ADMIN — CEFAZOLIN SODIUM 1 G: 1 SOLUTION INTRAVENOUS at 06:09

## 2018-01-01 RX ADMIN — METOPROLOL TARTRATE 25 MG: 25 TABLET, FILM COATED ORAL at 11:09
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
## 2018-06-15 NOTE — ED NOTES
Received call from GUERITA Soto at [facility].  Requesting fax of note from [employer].  Will contact on-call psychiatrist and will call back regarding acceptance.

## 2018-06-15 NOTE — ED PROVIDER NOTES
SCRIBE #1 NOTE: I, Corinne Mack, am scribing for, and in the presence of, Hardik Boss MD. I have scribed the HPI, ROS, and PEx.     SCRIBE #2 NOTE: I, Rhonda Antonio, am scribing for, and in the presence of,  Madhav Catherine Jr., MD. I have scribed the remaining portions of the note not scribed by Scribe #1.     History      Chief Complaint   Patient presents with    Urinary Tract Infection     Send by Dr Roche for possible UTI       Review of patient's allergies indicates:  No Known Allergies     HPI   HPI    6/15/2018, 1:18 PM   History obtained from the patient      History of Present Illness: Rosalina Thorpe is a 89 y.o. female patient with PMHx of Alzheimer's who presents to the Emergency Department for further evaluation of chronic UTI. Per the son, pt has been acting out of character and recently completed 2 rounds of 2 different abx with no relief. Pt otherwise has no complaints. No associated sxs reported. Patient denies any fever, chills, N/V, back pain, dysuria, hematuria, flank pain, HA, and all other sxs at this time. No further complaints or concerns at this time.         Arrival mode: Personal vehicle    PCP: Richar Block MD       Past Medical History:  Past medical history reviewed not relevant      Past Surgical History:  Past surgical history reviewed not relevant      Family History:  Family history reviewed not relevant      Social History:  Social History    Social History Main Topics    Social History Main Topics    Smoking status: Unknown if ever smoked    Smokeless tobacco: Unknown if ever used    Alcohol Use: Unknown drinking history    Drug Use: Unknown if ever used    Sexual Activity: Unknown       ROS   Review of Systems   Constitutional: Negative for chills and fever.   Respiratory: Negative for cough and shortness of breath.    Cardiovascular: Negative for chest pain and leg swelling.   Gastrointestinal: Negative for abdominal pain, diarrhea, nausea and vomiting.  "  Genitourinary: Negative for dysuria, flank pain and hematuria.        (+) chronic UTI   Musculoskeletal: Negative for back pain, neck pain and neck stiffness.   Skin: Negative for rash and wound.   Neurological: Negative for dizziness, light-headedness, numbness and headaches.   All other systems reviewed and are negative.    Physical Exam      Initial Vitals [06/15/18 1305]   BP Pulse Resp Temp SpO2   (!) 136/54 64 20 97.8 °F (36.6 °C) 98 %      MAP       --          Physical Exam  Nursing Notes and Vital Signs Reviewed.  Constitutional: Patient is in no apparent distress. Well-developed and well-nourished. Elderly.  Head: Atraumatic. Normocephalic.  Eyes: PERRL. EOM intact. Conjunctivae are not pale. No scleral icterus.  ENT: Mucous membranes are moist. Oropharynx is clear and symmetric.    Neck: Supple. Full ROM. No lymphadenopathy.  Cardiovascular: Regular rate. Regular rhythm. No murmurs, rubs, or gallops. Distal pulses are 2+ and symmetric.  Pulmonary/Chest: No respiratory distress. Clear to auscultation bilaterally. No wheezing or rales.  Abdominal: Soft and non-distended.  There is no tenderness.  No rebound, guarding, or rigidity.   Musculoskeletal: Moves all extremities. No obvious deformities.   Skin: Warm and dry.  Neurological:  Alert, awake, and appropriate. Pt is oriented to person and place, but not to time. Pt is at baseline.  Normal speech.  No acute focal neurological deficits are appreciated.  Psychiatric: Normal affect. Good eye contact. Appropriate in content.    ED Course    Procedures  ED Vital Signs:  Vitals:    06/15/18 1305 06/15/18 1536 06/15/18 1710 06/15/18 1720   BP: (!) 136/54 (!) 157/73  (!) 185/76   Pulse: 64 70 70 69   Resp: 20 20     Temp: 97.8 °F (36.6 °C)      TempSrc: Oral      SpO2: 98% 100% 100% 99%   Weight: 45.7 kg (100 lb 12 oz)      Height: 5' 2" (1.575 m)       06/15/18 1800   BP:    Pulse: 71   Resp:    Temp:    TempSrc:    SpO2: 99%   Weight:    Height:  "       Abnormal Lab Results:  Labs Reviewed   CBC W/ AUTO DIFFERENTIAL - Abnormal; Notable for the following:        Result Value    RDW 14.8 (*)     All other components within normal limits   COMPREHENSIVE METABOLIC PANEL - Abnormal; Notable for the following:     Glucose 139 (*)     BUN, Bld 35 (*)     eGFR if  41 (*)     eGFR if non  36 (*)     All other components within normal limits   URINALYSIS, REFLEX TO URINE CULTURE - Abnormal; Notable for the following:     Specific Gravity, UA >=1.030 (*)     All other components within normal limits    Narrative:     Preferred Collection Type->Urine, Catheterized   SALICYLATE LEVEL - Abnormal; Notable for the following:     Salicylate Lvl <5.0 (*)     All other components within normal limits   ACETAMINOPHEN LEVEL   DRUG SCREEN PANEL, URINE EMERGENCY   SALICYLATE LEVEL   DRUG SCREEN PANEL, URINE EMERGENCY    Narrative:     Preferred Collection Type->Urine, Catheterized        All Lab Results:  Results for orders placed or performed during the hospital encounter of 06/15/18   CBC auto differential   Result Value Ref Range    WBC 6.26 3.90 - 12.70 K/uL    RBC 4.27 4.00 - 5.40 M/uL    Hemoglobin 13.0 12.0 - 16.0 g/dL    Hematocrit 38.5 37.0 - 48.5 %    MCV 90 82 - 98 fL    MCH 30.4 27.0 - 31.0 pg    MCHC 33.8 32.0 - 36.0 g/dL    RDW 14.8 (H) 11.5 - 14.5 %    Platelets 245 150 - 350 K/uL    MPV 10.5 9.2 - 12.9 fL    Gran # (ANC) 3.5 1.8 - 7.7 K/uL    Lymph # 2.1 1.0 - 4.8 K/uL    Mono # 0.5 0.3 - 1.0 K/uL    Eos # 0.1 0.0 - 0.5 K/uL    Baso # 0.03 0.00 - 0.20 K/uL    Gran% 56.2 38.0 - 73.0 %    Lymph% 34.0 18.0 - 48.0 %    Mono% 8.5 4.0 - 15.0 %    Eosinophil% 0.8 0.0 - 8.0 %    Basophil% 0.5 0.0 - 1.9 %    Differential Method Automated    Comprehensive metabolic panel   Result Value Ref Range    Sodium 138 136 - 145 mmol/L    Potassium 4.1 3.5 - 5.1 mmol/L    Chloride 104 95 - 110 mmol/L    CO2 23 23 - 29 mmol/L    Glucose 139 (H) 70 - 110  mg/dL    BUN, Bld 35 (H) 10 - 30 mg/dL    Creatinine 1.3 0.5 - 1.4 mg/dL    Calcium 9.4 8.7 - 10.5 mg/dL    Total Protein 6.8 6.0 - 8.4 g/dL    Albumin 3.7 3.5 - 5.2 g/dL    Total Bilirubin 0.3 0.1 - 1.0 mg/dL    Alkaline Phosphatase 66 55 - 135 U/L    AST 23 10 - 40 U/L    ALT 20 10 - 44 U/L    Anion Gap 11 8 - 16 mmol/L    eGFR if African American 41 (A) >60 mL/min/1.73 m^2    eGFR if non African American 36 (A) >60 mL/min/1.73 m^2   Urinalysis, Reflex to Urine Culture Urine, Catheterized   Result Value Ref Range    Specimen UA Urine, Catheterized     Color, UA Yellow Yellow, Straw, Ritika    Appearance, UA Clear Clear    pH, UA 6.0 5.0 - 8.0    Specific Gravity, UA >=1.030 (A) 1.005 - 1.030    Protein, UA Negative Negative    Glucose, UA Negative Negative    Ketones, UA Negative Negative    Bilirubin (UA) Negative Negative    Occult Blood UA Negative Negative    Nitrite, UA Negative Negative    Urobilinogen, UA Negative <2.0 EU/dL    Leukocytes, UA Negative Negative   Drug screen panel, emergency   Result Value Ref Range    Benzodiazepines Negative     Methadone metabolites Negative     Cocaine (Metab.) Negative     Opiate Scrn, Ur Negative     Barbiturate Screen, Ur Negative     Amphetamine Screen, Ur Negative     THC Negative     Phencyclidine Negative     Creatinine, Random Ur 167.5 15.0 - 325.0 mg/dL    Toxicology Information SEE COMMENT    Salicylate level   Result Value Ref Range    Salicylate Lvl <5.0 (L) 15.0 - 30.0 mg/dL                The Emergency Provider reviewed the vital signs and test results, which are outlined above.    ED Discussion     2:21 PM: Dr. Boss transfers care of pt to Dr. Catherine, pending lab results.    2:35 PM: Dr. Catherine re-evaluated pt.  D/w pt's family all pertinent results. D/w pt's family any concerns expressed at this time. Answered all questions. Pt's family expresses understanding at this time.    3:43 PM: The PEC hold has been issued by Dr. Catherine at this time for  behavioral disturbance.    5:16 PM: Pt has been medically cleared by Dr. Catherine at this time. Reassessed pt at this time. Pt is resting comfortably and appears in no acute distress. There are no psychiatric services offered at this facility. D/w pt all pertinent ED information and plan to transfer to psychiatric facility for psychiatric treatment. Pt/family verbalizes understanding. Patient being transferred by [facility] for ongoing personal protection en route. Pt will be transported by personnel trained in CPR and CPI. All questions and complaints have been addressed at this time. Pt condition is stable at this time and is clear to transfer to psychiatric facility at this time.   Accepting Facility: Dr. Villalobos  Accepting Physician: [facility]        ED Medication(s):  Medications   cefTRIAXone injection 1 g (1 g Intravenous Given 6/15/18 5814)   lorazepam (ATIVAN) injection 1 mg (1 mg Intravenous Given 6/15/18 0301)       New Prescriptions    No medications on file             Medical Decision Making    Medical Decision Making:   Clinical Tests:   Lab Tests: Ordered and Reviewed           Scribe Attestation:   Scribe #1: I performed the above scribed service and the documentation accurately describes the services I performed. I attest to the accuracy of the note.    Attending:   Physician Attestation Statement for Scribe #1: I, Hardik Boss MD, personally performed the services described in this documentation, as scribed by Corinne Mack, in my presence, and it is both accurate and complete.       Scribe Attestation:   Scribe #2: I performed the above scribed service and the documentation accurately describes the services I performed. I attest to the accuracy of the note.    Attending Attestation:           Physician Attestation for Scribe:    Physician Attestation Statement for Scribe #2: I, Madhav Catherine Jr., MD, reviewed documentation, as scribed by Rhonda Antonio in my presence, and it is  both accurate and complete. I also acknowledge and confirm the content of the note done by Scribe #1.          Clinical Impression       ICD-10-CM ICD-9-CM   1. Alzheimer's dementia with behavioral disturbance, unspecified timing of dementia onset G30.9 331.0    F02.81 294.11   2. Agitation R45.1 307.9       Disposition:   Disposition: Transferred  Condition: Stable           Madhav Catherine Jr., MD  06/16/18 1012

## 2018-06-16 NOTE — ED NOTES
Pt received asleep on bed.  Pt under PEC. Pt accepted by [facility] by Dr Reyna per [facility].  Call report to [phone].  Pt son Bharath Thorpe notified of pt transfer.

## 2018-06-16 NOTE — ED NOTES
Report called to Chela SONI at [facility]. Awaiting transport.  Pt cont asleep in NAD. Sitter at bedside.

## 2018-09-20 PROBLEM — S72.002A CLOSED FRACTURE OF LEFT HIP: Status: ACTIVE | Noted: 2018-01-01

## 2018-09-20 PROBLEM — D72.829 LEUKOCYTOSIS: Status: ACTIVE | Noted: 2018-01-01

## 2018-09-20 PROBLEM — F03.90 DEMENTIA WITHOUT BEHAVIORAL DISTURBANCE: Status: ACTIVE | Noted: 2018-01-01

## 2018-09-20 NOTE — HPI
Rosalina Thorpe is a 89 year old female with hypertension and dementia who presented to the ED with complaints of left hip, left knee and back pain. Reportedly, the patient was found in her bed at the [facility] where she resides complaining of pain. There was not a witnessed fall and the patient's bed alarm did not activate. Due to dementia, the patient is unable to provide any history. Evaluation of the patient's pain in the ED included CT head, x-rays of her left knee, bilateral hips and spine. Due to findings of an acute fracture of the left femoral neck, a CT scan of the hip was performed and confirmed a left subcapital femoral neck fracture with superior displacement of the intertrochanteric femur with respect to the femoral head. Other imaging studies were negative for acute findings. Labs and EKG in the ED were unremarkable with the exception of an 18,390 WBC count. The patient's son, Tim Thorpe, is her medical power of  and the patient is a DNR.

## 2018-09-20 NOTE — ED PROVIDER NOTES
Encounter Date: 9/20/2018       History     Chief Complaint   Patient presents with    Hip Pain     left hip pain, possible fall     HPI  Review of patient's allergies indicates:  No Known Allergies  Past Medical History:   Diagnosis Date    Alzheimer disease     Chronic UTI     Essential (primary) hypertension     Glaucoma     Macular degeneration     Major depressive disorder     Renal disorder     Urinary tract infection      Past Surgical History:   Procedure Laterality Date    APPENDECTOMY      EYE SURGERY      cataract surger ou    HYSTERECTOMY       History reviewed. No pertinent family history.  Social History     Tobacco Use    Smoking status: Never Smoker    Smokeless tobacco: Never Used   Substance Use Topics    Alcohol use: Not on file    Drug use: Not on file     Review of Systems    Physical Exam     Initial Vitals [09/20/18 0810]   BP Pulse Resp Temp SpO2   (!) 200/75 74 20 98.4 °F (36.9 °C) 98 %      MAP       --         Physical Exam    ED Course   Procedures  Labs Reviewed   BASIC METABOLIC PANEL - Abnormal; Notable for the following components:       Result Value    Sodium 135 (*)     Glucose 142 (*)     All other components within normal limits   CBC W/ AUTO DIFFERENTIAL - Abnormal; Notable for the following components:    WBC 18.39 (*)     RDW 14.8 (*)     Gran # (ANC) 16.4 (*)     Gran% 89.4 (*)     Lymph% 5.5 (*)     All other components within normal limits   URINALYSIS - Abnormal; Notable for the following components:    Protein, UA Trace (*)     Ketones, UA Trace (*)     Occult Blood UA 1+ (*)     All other components within normal limits   URINALYSIS MICROSCOPIC - Abnormal; Notable for the following components:    RBC, UA 5 (*)     All other components within normal limits   APTT   PROTIME-INR   TROPONIN I   CK          Imaging Results          CT Hip Without Contrast Left (Final result)  Result time 09/20/18 09:22:12    Final result by Liang Sunshine MD (09/20/18  09:22:12)                 Impression:      Acute subcapital left femoral neck fracture with displacement.    Underlying osteopenia.    Possible chronic S1 sacral fracture.    Sacroiliitis.  Lumbar degenerative disc disease and facet arthritis.    Incidental demonstration of sigmoid diverticulosis.      Electronically signed by: Liang Sunshine MD  Date:    09/20/2018  Time:    09:22             Narrative:    EXAMINATION:  CT HIP WITHOUT CONTRAST LEFT    CLINICAL HISTORY:  Trauma with left femoral neck fracture.  >    TECHNIQUE:  CT of the pelvis without contrast with coronal and sagittal reformats.  All CT scans at this facility are performed  using dose modulation techniques as appropriate to performed exam including the following:  automated exposure control; adjustment of mA and/or kV according to the patients size (this includes techniques or standardized protocols for targeted exams where dose is matched to indication/reason for exam: i.e. extremities or head);  iterative reconstruction technique.    COMPARISON:  None    FINDINGS:  Aortic atherosclerosis is noted.  Lumbar degenerative disc disease and facet arthritis.  Mild bilateral sacroiliitis.    Generalized demineralization suggests osteopenia.  Sagittal reformat reveals a questionable chronic S1/coccyx fracture.    A left subcapital femoral neck fracture is present with superior displacement of the intertrochanteric femur with respect to the femoral head.    The acetabulum is intact.  The pubic rami are intact.    The right femoral neck is normal.                               CT Head Without Contrast (Final result)  Result time 09/20/18 09:19:13    Final result by Liang Sunshine MD (09/20/18 09:19:13)                 Impression:      Moderately advanced age-related atrophy.  No acute findings.      Electronically signed by: Liang Sunshine MD  Date:    09/20/2018  Time:    09:19             Narrative:    EXAMINATION:  CT HEAD WITHOUT  CONTRAST    CLINICAL HISTORY:  Head injury with headache after a fall.    TECHNIQUE:  Standard noncontrast CT of the brain. All CT scans at this facility use dose modulation, iterative reconstruction and/or weight based dosing when appropriate to reduce radiation dose to as low as reasonably achievable.    COMPARISON:  None    FINDINGS:  The ventricles are moderately enlarged consistent with volume loss.  No acute edema, hemorrhage or mass effect is present.    Intracranial vascular calcification is noted.    No definite intracranial hemorrhage or subdural hematoma.    The skull appears grossly negative with no evidence of acute fracture.                               X-Ray Chest 1 View (Final result)  Result time 09/20/18 09:08:03   Procedure changed from X-Ray Chest PA And Lateral     Final result by Liang Sunshine MD (09/20/18 09:08:03)                 Impression:      No definite acute finding.      Electronically signed by: Liang Sunshine MD  Date:    09/20/2018  Time:    09:08             Narrative:    EXAMINATION:  XR CHEST 1 VIEW    CLINICAL HISTORY:  Chest trauma with chest pain due to a fall., Fall;    COMPARISON:  None    FINDINGS:  Cardiac size is normal.  Aorta is mildly enlarged and calcified as well as elongated.    The lung fields appear grossly clear.    The patient is rotated to the right.  Mild thoracic spondylosis.                               X-Ray Cervical Spine AP And Lateral (Final result)  Result time 09/20/18 09:13:12    Final result by KATHIE German Sr., MD (09/20/18 09:13:12)                 Impression:      1. There is grade 1 anterolisthesis of C3 on C4.  2. There are mild degenerative changes between C4 and C7.  .      Electronically signed by: Casey German MD  Date:    09/20/2018  Time:    09:13             Narrative:    EXAMINATION:  XR CERVICAL SPINE AP LATERAL    CLINICAL HISTORY:  Unspecified fall, initial encounter    COMPARISON:  A plain film examination of the  thoracic spine performed on 09/20/2018.    FINDINGS:  There is grade 1 anterolisthesis of C3 on C4.  There is no fracture or scoliosis.  There is normal cervical lordosis.  There are mild degenerative changes between C4 and C7.  The prevertebral soft tissue space is normal in appearance.                               X-Ray Thoracic Spine AP Lateral (Final result)  Result time 09/20/18 09:18:20    Final result by KATHIE German Sr., MD (09/20/18 09:18:20)                 Impression:      1. The superior aspect of the thoracic spine is slightly tilted towards the right.  This may be positional.  2. There are mild degenerative changes in the thoracic spine.  3. There is an old-appearing mild anterior wedge defect/fracture of the T12 vertebral body.      Electronically signed by: Casey German MD  Date:    09/20/2018  Time:    09:18             Narrative:    EXAMINATION:  XR THORACIC SPINE AP LATERAL    COMPARISON:  A plain film examination of the cervical spine performed on 09/20/2018.  Comparison was also made to a plain film examination of the lumbar spine performed on 09/20/2018.    FINDINGS:  The superior aspect of the thoracic spine is slightly tilted towards the right.  There is an old-appearing mild anterior wedge defect/fracture of the T12 vertebral body.  There is no spondylolisthesis in the thoracic spine.  There is normal thoracic kyphosis.  There are mild degenerative changes in the thoracic spine.                               X-Ray Lumbar Spine Ap And Lateral (Final result)  Result time 09/20/18 09:10:10    Final result by KATHIE German Sr., MD (09/20/18 09:10:10)                 Impression:      1. There are mild degenerative changes between T12 and L4 vertebral bodies.  2. There is an old-appearing mild anterior wedge defect/fracture of the T12 vertebral body.  3. There is a moderate amount of atherosclerosis.  4. There is a prominent amount of fecal material within the colon.      Electronically  signed by: Casey German MD  Date:    09/20/2018  Time:    09:10             Narrative:    EXAMINATION:  XR LUMBAR SPINE AP AND LATERAL    CLINICAL HISTORY:  T/L-spine trauma, minor-mod, low back pain;    COMPARISON:  None    FINDINGS:  There are 5 lumbar type vertebral bodies. There is no fracture, spondylolisthesis, or scoliosis of the lumbar spine.  There is normal lumbar lordosis.  There are mild degenerative changes between T12 and L4 vertebral bodies.  There is an old-appearing mild anterior wedge defect/fracture of the T12 vertebral body.  There is a moderate amount of atherosclerosis.  There is a prominent amount of fecal material within the colon.                               X-Ray Knee 1 or 2 View Left (Final result)  Result time 09/20/18 09:07:09   Procedure changed from X-Ray Knee Complete 4 or More Views Left     Final result by KATHIE German Sr., MD (09/20/18 09:07:09)                 Impression:      1. No fracture or dislocation  2. There is a moderate amount of atherosclerosis.      Electronically signed by: Casey German MD  Date:    09/20/2018  Time:    09:07             Narrative:    EXAMINATION:  XR KNEE 1 OR 2 VIEW LEFT    CLINICAL HISTORY:  Pain in unspecified kneefall;    COMPARISON:  None    FINDINGS:  There is no fracture. There is no dislocation.  There is a moderate amount of atherosclerosis.                               X-Ray Hips Bilateral 2 View Incl AP Pelvis (Final result)  Result time 09/20/18 09:05:48    Final result by KATHIE German Sr., MD (09/20/18 09:05:48)                 Impression:      1. There is an acute appearing fracture through the left femoral neck.  2. There is a prominent amount of fecal material within the colon.  3. There is a mild amount of atherosclerosis.      Electronically signed by: Casey German MD  Date:    09/20/2018  Time:    09:05             Narrative:    EXAMINATION:  XR HIPS BILATERAL 2 VIEW INCL AP PELVIS    CLINICAL HISTORY:  Pain in  unspecified hip    COMPARISON:  None    FINDINGS:  There is an acute appearing fracture through the left femoral neck.  The left femoral head still articulates with the left acetabulum.  There is a mild amount of atherosclerosis.  There is a prominent amount of fecal material within the colon.                                                      Clinical Impression:   {Add your Clinical Impression here. If you haven't documented one yet, please pend the note, finalize a Clinical Impression, and refresh your note before signing.:[id number]}

## 2018-09-20 NOTE — ED PROVIDER NOTES
Encounter Date: 9/20/2018    SCRIBE #1 NOTE: I, Asuncion Catherine, am scribing for, and in the presence of,  CAPRI Romano. I have scribed the following portions of the note - Other sections scribed: EKG results, MDM.       History     Chief Complaint   Patient presents with    Hip Pain     left hip pain, possible fall     The history is provided by the patient.   Fall   The accident occurred several hours ago. The fall occurred while walking. She fell from a height of 1 to 2 ft. She landed on a hard floor. The pain is present in the left hip, left knee and back. The pain is at a severity of 5/10. She was ambulatory at the scene. There was no entrapment after the fall. There was no drug use involved in the accident. There was no alcohol use involved in the accident. Associated symptoms include neck pain, back pain, nausea and vomiting. Pertinent negatives include no paresthesias, no paralysis, no visual change, no fever, no numbness, no abdominal pain, no bowel incontinence, no hematuria, no headaches, no hearing loss, no loss of consciousness and no tingling. The symptoms are aggravated by activity, pressure on the injury, use of the injured limb, ambulation, flexion, extension and rotation. She has tried nothing for the symptoms.     Review of patient's allergies indicates:  No Known Allergies  Past Medical History:   Diagnosis Date    Alzheimer disease     Chronic UTI     Essential (primary) hypertension     Glaucoma     Macular degeneration     Major depressive disorder     Renal disorder     Urinary tract infection      Past Surgical History:   Procedure Laterality Date    APPENDECTOMY      EYE SURGERY      cataract surger ou    HYSTERECTOMY       History reviewed. No pertinent family history.  Social History     Tobacco Use    Smoking status: Never Smoker    Smokeless tobacco: Never Used   Substance Use Topics    Alcohol use: Not on file    Drug use: Not on file     Review of Systems    Constitutional: Negative for chills and fever.   HENT: Negative for sore throat.    Eyes: Negative for photophobia and redness.   Respiratory: Negative for cough and shortness of breath.    Cardiovascular: Negative for chest pain.   Gastrointestinal: Positive for nausea and vomiting. Negative for abdominal pain, bowel incontinence and diarrhea.   Endocrine: Negative for polydipsia and polyphagia.   Genitourinary: Negative for dysuria and hematuria.   Musculoskeletal: Positive for arthralgias (left hip and knee pain), back pain, gait problem, joint swelling and neck pain. Negative for myalgias.   Skin: Negative for rash.   Neurological: Negative for tingling, loss of consciousness, weakness, numbness, headaches and paresthesias.   Hematological: Does not bruise/bleed easily.   Psychiatric/Behavioral: The patient is not nervous/anxious.    All other systems reviewed and are negative.      Physical Exam     Initial Vitals [09/20/18 0810]   BP Pulse Resp Temp SpO2   (!) 200/75 74 20 98.4 °F (36.9 °C) 98 %      MAP       --         Physical Exam    Nursing note and vitals reviewed.  Constitutional: Vital signs are normal. She appears well-developed and well-nourished. She appears distressed (secondary to pain).   HENT:   Head: Normocephalic and atraumatic.   Right Ear: External ear normal.   Left Ear: External ear normal.   Nose: Nose normal.   Mouth/Throat: Oropharynx is clear and moist.   Eyes: Conjunctivae, EOM and lids are normal. Pupils are equal, round, and reactive to light.   Neck: Normal range of motion and full passive range of motion without pain. Neck supple.   Cardiovascular: Normal rate, regular rhythm, S1 normal, S2 normal, normal heart sounds, intact distal pulses and normal pulses.   Pulmonary/Chest: Breath sounds normal. No respiratory distress. She has no wheezes. She has no rales.   Abdominal: Soft. Normal appearance and bowel sounds are normal. She exhibits no distension. There is no tenderness.    Musculoskeletal:        Left hip: She exhibits decreased range of motion, decreased strength, tenderness, bony tenderness and swelling. She exhibits no crepitus, no deformity and no laceration.        Left knee: She exhibits decreased range of motion, swelling and bony tenderness. Tenderness found. Medial joint line and lateral joint line tenderness noted.        Cervical back: She exhibits decreased range of motion, tenderness, bony tenderness, pain and spasm.        Thoracic back: She exhibits decreased range of motion, tenderness and bony tenderness.        Lumbar back: She exhibits decreased range of motion, tenderness and bony tenderness.   Lymphadenopathy:     She has no cervical adenopathy.   Neurological: She is alert and oriented to person, place, and time. She has normal strength. No cranial nerve deficit or sensory deficit. Coordination and gait normal.   Skin: Skin is warm, dry and intact.   Psychiatric: She has a normal mood and affect. Her speech is normal and behavior is normal. Judgment and thought content normal. Cognition and memory are normal.         ED Course   Procedures  Labs Reviewed   BASIC METABOLIC PANEL - Abnormal; Notable for the following components:       Result Value    Sodium 135 (*)     Glucose 142 (*)     All other components within normal limits   CBC W/ AUTO DIFFERENTIAL - Abnormal; Notable for the following components:    WBC 18.39 (*)     RDW 14.8 (*)     Gran # (ANC) 16.4 (*)     Gran% 89.4 (*)     Lymph% 5.5 (*)     All other components within normal limits   URINALYSIS - Abnormal; Notable for the following components:    Protein, UA Trace (*)     Ketones, UA Trace (*)     Occult Blood UA 1+ (*)     All other components within normal limits   URINALYSIS MICROSCOPIC - Abnormal; Notable for the following components:    RBC, UA 5 (*)     All other components within normal limits   APTT   PROTIME-INR   TROPONIN I   CK         Lab Results:  Results for orders placed or performed  during the hospital encounter of 09/20/18   APTT   Result Value Ref Range    aPTT 25.4 21.0 - 32.0 sec   Protime-INR   Result Value Ref Range    Prothrombin Time 10.9 9.0 - 12.5 sec    INR 1.1 0.8 - 1.2   Basic metabolic panel   Result Value Ref Range    Sodium 135 (L) 136 - 145 mmol/L    Potassium 3.9 3.5 - 5.1 mmol/L    Chloride 100 95 - 110 mmol/L    CO2 24 23 - 29 mmol/L    Glucose 142 (H) 70 - 110 mg/dL    BUN, Bld 22 10 - 30 mg/dL    Creatinine 0.8 0.5 - 1.4 mg/dL    Calcium 9.4 8.7 - 10.5 mg/dL    Anion Gap 11 8 - 16 mmol/L    eGFR if African American >60 >60 mL/min/1.73 m^2    eGFR if non African American >60 >60 mL/min/1.73 m^2   CBC auto differential   Result Value Ref Range    WBC 18.39 (H) 3.90 - 12.70 K/uL    RBC 4.39 4.00 - 5.40 M/uL    Hemoglobin 12.9 12.0 - 16.0 g/dL    Hematocrit 37.8 37.0 - 48.5 %    MCV 86 82 - 98 fL    MCH 29.4 27.0 - 31.0 pg    MCHC 34.1 32.0 - 36.0 g/dL    RDW 14.8 (H) 11.5 - 14.5 %    Platelets 269 150 - 350 K/uL    MPV 9.5 9.2 - 12.9 fL    Gran # (ANC) 16.4 (H) 1.8 - 7.7 K/uL    Lymph # 1.0 1.0 - 4.8 K/uL    Mono # 0.9 0.3 - 1.0 K/uL    Eos # 0.0 0.0 - 0.5 K/uL    Baso # 0.01 0.00 - 0.20 K/uL    Gran% 89.4 (H) 38.0 - 73.0 %    Lymph% 5.5 (L) 18.0 - 48.0 %    Mono% 5.0 4.0 - 15.0 %    Eosinophil% 0.0 0.0 - 8.0 %    Basophil% 0.1 0.0 - 1.9 %    Differential Method Automated    Troponin I   Result Value Ref Range    Troponin I <0.006 0.000 - 0.026 ng/mL   CPK   Result Value Ref Range     20 - 180 U/L   Urinalysis Catheterized   Result Value Ref Range    Specimen UA Urine, Clean Catch     Color, UA Yellow Yellow, Straw, Ritika    Appearance, UA Clear Clear    pH, UA 7.0 5.0 - 8.0    Specific Gravity, UA 1.020 1.005 - 1.030    Protein, UA Trace (A) Negative    Glucose, UA Negative Negative    Ketones, UA Trace (A) Negative    Bilirubin (UA) Negative Negative    Occult Blood UA 1+ (A) Negative    Nitrite, UA Negative Negative    Urobilinogen, UA Negative <2.0 EU/dL     Leukocytes, UA Negative Negative   Urinalysis Microscopic   Result Value Ref Range    RBC, UA 5 (H) 0 - 4 /hpf    Microscopic Comment SEE COMMENT      Imaging Results:  Imaging Results          CT Hip Without Contrast Left (Final result)  Result time 09/20/18 09:22:12    Final result by Liang Sunshine MD (09/20/18 09:22:12)                 Impression:      Acute subcapital left femoral neck fracture with displacement.    Underlying osteopenia.    Possible chronic S1 sacral fracture.    Sacroiliitis.  Lumbar degenerative disc disease and facet arthritis.    Incidental demonstration of sigmoid diverticulosis.      Electronically signed by: Liang Sunshine MD  Date:    09/20/2018  Time:    09:22             Narrative:    EXAMINATION:  CT HIP WITHOUT CONTRAST LEFT    CLINICAL HISTORY:  Trauma with left femoral neck fracture.  >    TECHNIQUE:  CT of the pelvis without contrast with coronal and sagittal reformats.  All CT scans at this facility are performed  using dose modulation techniques as appropriate to performed exam including the following:  automated exposure control; adjustment of mA and/or kV according to the patients size (this includes techniques or standardized protocols for targeted exams where dose is matched to indication/reason for exam: i.e. extremities or head);  iterative reconstruction technique.    COMPARISON:  None    FINDINGS:  Aortic atherosclerosis is noted.  Lumbar degenerative disc disease and facet arthritis.  Mild bilateral sacroiliitis.    Generalized demineralization suggests osteopenia.  Sagittal reformat reveals a questionable chronic S1/coccyx fracture.    A left subcapital femoral neck fracture is present with superior displacement of the intertrochanteric femur with respect to the femoral head.    The acetabulum is intact.  The pubic rami are intact.    The right femoral neck is normal.                               CT Head Without Contrast (Final result)  Result time 09/20/18  09:19:13    Final result by Liang Sunshine MD (09/20/18 09:19:13)                 Impression:      Moderately advanced age-related atrophy.  No acute findings.      Electronically signed by: Liang Sunshine MD  Date:    09/20/2018  Time:    09:19             Narrative:    EXAMINATION:  CT HEAD WITHOUT CONTRAST    CLINICAL HISTORY:  Head injury with headache after a fall.    TECHNIQUE:  Standard noncontrast CT of the brain. All CT scans at this facility use dose modulation, iterative reconstruction and/or weight based dosing when appropriate to reduce radiation dose to as low as reasonably achievable.    COMPARISON:  None    FINDINGS:  The ventricles are moderately enlarged consistent with volume loss.  No acute edema, hemorrhage or mass effect is present.    Intracranial vascular calcification is noted.    No definite intracranial hemorrhage or subdural hematoma.    The skull appears grossly negative with no evidence of acute fracture.                               X-Ray Chest 1 View (Final result)  Result time 09/20/18 09:08:03   Procedure changed from X-Ray Chest PA And Lateral     Final result by Liang Sunshine MD (09/20/18 09:08:03)                 Impression:      No definite acute finding.      Electronically signed by: Liang Sunshine MD  Date:    09/20/2018  Time:    09:08             Narrative:    EXAMINATION:  XR CHEST 1 VIEW    CLINICAL HISTORY:  Chest trauma with chest pain due to a fall., Fall;    COMPARISON:  None    FINDINGS:  Cardiac size is normal.  Aorta is mildly enlarged and calcified as well as elongated.    The lung fields appear grossly clear.    The patient is rotated to the right.  Mild thoracic spondylosis.                               X-Ray Cervical Spine AP And Lateral (Final result)  Result time 09/20/18 09:13:12    Final result by KATHIE German Sr., MD (09/20/18 09:13:12)                 Impression:      1. There is grade 1 anterolisthesis of C3 on C4.  2. There are  mild degenerative changes between C4 and C7.  .      Electronically signed by: Casey German MD  Date:    09/20/2018  Time:    09:13             Narrative:    EXAMINATION:  XR CERVICAL SPINE AP LATERAL    CLINICAL HISTORY:  Unspecified fall, initial encounter    COMPARISON:  A plain film examination of the thoracic spine performed on 09/20/2018.    FINDINGS:  There is grade 1 anterolisthesis of C3 on C4.  There is no fracture or scoliosis.  There is normal cervical lordosis.  There are mild degenerative changes between C4 and C7.  The prevertebral soft tissue space is normal in appearance.                               X-Ray Thoracic Spine AP Lateral (Final result)  Result time 09/20/18 09:18:20    Final result by KATHIE German Sr., MD (09/20/18 09:18:20)                 Impression:      1. The superior aspect of the thoracic spine is slightly tilted towards the right.  This may be positional.  2. There are mild degenerative changes in the thoracic spine.  3. There is an old-appearing mild anterior wedge defect/fracture of the T12 vertebral body.      Electronically signed by: Casey German MD  Date:    09/20/2018  Time:    09:18             Narrative:    EXAMINATION:  XR THORACIC SPINE AP LATERAL    COMPARISON:  A plain film examination of the cervical spine performed on 09/20/2018.  Comparison was also made to a plain film examination of the lumbar spine performed on 09/20/2018.    FINDINGS:  The superior aspect of the thoracic spine is slightly tilted towards the right.  There is an old-appearing mild anterior wedge defect/fracture of the T12 vertebral body.  There is no spondylolisthesis in the thoracic spine.  There is normal thoracic kyphosis.  There are mild degenerative changes in the thoracic spine.                               X-Ray Lumbar Spine Ap And Lateral (Final result)  Result time 09/20/18 09:10:10    Final result by KATHIE German Sr., MD (09/20/18 09:10:10)                 Impression:       1. There are mild degenerative changes between T12 and L4 vertebral bodies.  2. There is an old-appearing mild anterior wedge defect/fracture of the T12 vertebral body.  3. There is a moderate amount of atherosclerosis.  4. There is a prominent amount of fecal material within the colon.      Electronically signed by: Casey German MD  Date:    09/20/2018  Time:    09:10             Narrative:    EXAMINATION:  XR LUMBAR SPINE AP AND LATERAL    CLINICAL HISTORY:  T/L-spine trauma, minor-mod, low back pain;    COMPARISON:  None    FINDINGS:  There are 5 lumbar type vertebral bodies. There is no fracture, spondylolisthesis, or scoliosis of the lumbar spine.  There is normal lumbar lordosis.  There are mild degenerative changes between T12 and L4 vertebral bodies.  There is an old-appearing mild anterior wedge defect/fracture of the T12 vertebral body.  There is a moderate amount of atherosclerosis.  There is a prominent amount of fecal material within the colon.                               X-Ray Knee 1 or 2 View Left (Final result)  Result time 09/20/18 09:07:09   Procedure changed from X-Ray Knee Complete 4 or More Views Left     Final result by KATHIE German Sr., MD (09/20/18 09:07:09)                 Impression:      1. No fracture or dislocation  2. There is a moderate amount of atherosclerosis.      Electronically signed by: Casey German MD  Date:    09/20/2018  Time:    09:07             Narrative:    EXAMINATION:  XR KNEE 1 OR 2 VIEW LEFT    CLINICAL HISTORY:  Pain in unspecified kneefall;    COMPARISON:  None    FINDINGS:  There is no fracture. There is no dislocation.  There is a moderate amount of atherosclerosis.                               X-Ray Hips Bilateral 2 View Incl AP Pelvis (Final result)  Result time 09/20/18 09:05:48    Final result by KATHIE German Sr., MD (09/20/18 09:05:48)                 Impression:      1. There is an acute appearing fracture through the left femoral  neck.  2. There is a prominent amount of fecal material within the colon.  3. There is a mild amount of atherosclerosis.      Electronically signed by: Casey German MD  Date:    09/20/2018  Time:    09:05             Narrative:    EXAMINATION:  XR HIPS BILATERAL 2 VIEW INCL AP PELVIS    CLINICAL HISTORY:  Pain in unspecified hip    COMPARISON:  None    FINDINGS:  There is an acute appearing fracture through the left femoral neck.  The left femoral head still articulates with the left acetabulum.  There is a mild amount of atherosclerosis.  There is a prominent amount of fecal material within the colon.                              The EKG was ordered, reviewed, and independently interpreted by the ED provider.  Interpretation time: 8:32  Rate: 71 BPM  Rhythm: normal sinus rhythm  Interpretation: no acute ST/T changes. No STEMI.       Medical Decision Making:   Clinical Tests:   Lab Tests: Ordered and Reviewed  Radiological Study: Ordered and Reviewed  Medical Tests: Ordered and Reviewed  Other:   I have discussed this case with another health care provider.       <> Summary of the Discussion: Discussed case with Dr. Peralta (ortho) who asks that pt be admitted to medicine and kept NPO after midnight.            Scribe Attestation:   Scribe #1: I performed the above scribed service and the documentation accurately describes the services I performed. I attest to the accuracy of the note.    Attending Attestation:           Physician Attestation for Scribe:  Physician Attestation Statement for Scribe #1: I, CAPRI Romano, reviewed documentation, as scribed by Asuncion Catherine in my presence, and it is both accurate and complete.                    Clinical Impression:   The primary encounter diagnosis was Closed fracture of left hip, initial encounter. Diagnoses of Hip pain, Knee pain, Back pain, Fall, and Hypertension, unspecified type were also pertinent to this visit.      Disposition:   Disposition:  Admitted  Condition: Stable                        CAPRI Gutierrez  09/20/18 1129

## 2018-09-20 NOTE — H&P
[facility] Hospital Medicine  History & Physical    Patient Name: Rosalina Thorpe  MRN: [id number]  Admission Date: 9/20/2018  Attending Physician: Brandy Bowens Do, MD   Primary Care Provider: Richar Block MD         Patient information was obtained from patient, past medical records and ER records.     Subjective:     Principal Problem:Closed fracture of left hip    Chief Complaint:   Chief Complaint   Patient presents with    Hip Pain     left hip pain, possible fall        HPI: Rosalnia Thorpe is a 89 year old female with hypertension and dementia who presented to the ED with complaints of left hip, left knee and back pain. Reportedly, the patient was found in her bed at the [facility] where she resides complaining of pain. There was not a witnessed fall and the patient's bed alarm did not activate. Due to dementia, the patient is unable to provide any history. Evaluation of the patient's pain in the ED included CT head, x-rays of her left knee, bilateral hips and spine. Due to findings of an acute fracture of the left femoral neck, a CT scan of the hip was performed and confirmed a left subcapital femoral neck fracture with superior displacement of the intertrochanteric femur with respect to the femoral head. Other imaging studies were negative for acute findings. Labs and EKG in the ED were unremarkable with the exception of an 18,390 WBC count. The patient's son, Tim Thorpe, is her medical power of  and the patient is a DNR.     Past Medical History:   Diagnosis Date    Alzheimer disease     Chronic UTI     Essential (primary) hypertension     Glaucoma     Macular degeneration     Major depressive disorder     Renal disorder     Urinary tract infection        Past Surgical History:   Procedure Laterality Date    APPENDECTOMY      EYE SURGERY      cataract surger ou    HYSTERECTOMY         Review of patient's allergies indicates:  No Known  Allergies    No current facility-administered medications on file prior to encounter.      Current Outpatient Medications on File Prior to Encounter   Medication Sig    aspirin 81 MG Chew Take 81 mg by mouth once daily.    cephALEXin (KEFLEX) 250 MG capsule Take 250 mg by mouth once daily.    citalopram (CELEXA) 10 MG tablet Take 10 mg by mouth 2 (two) times daily.    diltiaZEM (CARDIZEM CD) 240 MG 24 hr capsule Take 240 mg by mouth nightly.    latanoprost 0.005 % ophthalmic solution Place 1 drop into both eyes every evening.     loratadine (CLARITIN) 10 mg tablet Take 10 mg by mouth once daily.    losartan-hydrochlorothiazide 50-12.5 mg (HYZAAR) 50-12.5 mg per tablet Take 1 tablet by mouth once daily.    megestrol (MEGACE) 20 MG Tab Take 20 mg by mouth once daily.    melatonin 10 mg TbSR Take by mouth.    metoprolol tartrate (LOPRESSOR) 25 MG tablet Take 25 mg by mouth 2 (two) times daily.    [DISCONTINUED] citalopram (CELEXA) 10 MG tablet Take 10 mg by mouth once daily.    [DISCONTINUED] donepezil (ARICEPT) 5 MG tablet Take 5 mg by mouth every evening.    [DISCONTINUED] dorzolamide-timolol 2-0.5% (COSOPT) 22.3-6.8 mg/mL ophthalmic solution 1 drop 2 (two) times daily.     Family History     Reviewed and not pertinent.        Tobacco Use    Smoking status: Never Smoker    Smokeless tobacco: Never Used   Substance and Sexual Activity    Alcohol use: Not on file    Drug use: Not on file    Sexual activity: Not on file     Review of Systems   Unable to perform ROS: Dementia     Objective:     Vital Signs (Most Recent):  Temp: 98.1 °F (36.7 °C) (09/20/18 1403)  Pulse: 72 (09/20/18 1403)  Resp: 20 (09/20/18 1403)  BP: (!) 160/71 (09/20/18 1403)  SpO2: 99 % (09/20/18 1403) Vital Signs (24h Range):  Temp:  [98 °F (36.7 °C)-98.7 °F (37.1 °C)] 98.1 °F (36.7 °C)  Pulse:  [69-77] 72  Resp:  [18-21] 20  SpO2:  [96 %-99 %] 99 %  BP: (160-200)/(62-79) 160/71        There is no height or weight on file to  calculate BMI.    Physical Exam   Constitutional: She appears well-developed and well-nourished. No distress.   HENT:   Head: Normocephalic and atraumatic.   Eyes: Conjunctivae are normal.   PERRL   Neck: Neck supple. No JVD present.   Cardiovascular: Normal rate, regular rhythm and normal heart sounds.   Pulmonary/Chest: Effort normal and breath sounds normal.   Abdominal: Soft. Bowel sounds are normal. She exhibits no distension. There is no tenderness.   Musculoskeletal: Normal range of motion.   Neurological: She is alert. She is disoriented.   Skin: Skin is warm and dry.   Small bruises to bilateral lower extremities.   Psychiatric: Her behavior is normal. Thought content normal. Her mood appears anxious. Cognition and memory are impaired. She expresses impulsivity.   Nursing note and vitals reviewed.          Significant Labs:   CBC:   Recent Labs   Lab  09/20/18   0825   WBC  18.39*   HGB  12.9   HCT  37.8   PLT  269     CMP:   Recent Labs   Lab  09/20/18   0825   NA  135*   K  3.9   CL  100   CO2  24   GLU  142*   BUN  22   CREATININE  0.8   CALCIUM  9.4   ANIONGAP  11   EGFRNONAA  >60     All pertinent labs within the past 24 hours have been reviewed.    Significant Imaging: I have reviewed all pertinent imaging results/findings within the past 24 hours.   Imaging Results          CT Hip Without Contrast Left (Final result)  Result time 09/20/18 09:22:12    Final result by Liang Sunshine MD (09/20/18 09:22:12)                 Impression:      Acute subcapital left femoral neck fracture with displacement.    Underlying osteopenia.    Possible chronic S1 sacral fracture.    Sacroiliitis.  Lumbar degenerative disc disease and facet arthritis.    Incidental demonstration of sigmoid diverticulosis.      Electronically signed by: Liang Sunshine MD  Date:    09/20/2018  Time:    09:22             Narrative:    EXAMINATION:  CT HIP WITHOUT CONTRAST LEFT    CLINICAL HISTORY:  Trauma with left femoral neck  fracture.  >    TECHNIQUE:  CT of the pelvis without contrast with coronal and sagittal reformats.  All CT scans at this facility are performed  using dose modulation techniques as appropriate to performed exam including the following:  automated exposure control; adjustment of mA and/or kV according to the patients size (this includes techniques or standardized protocols for targeted exams where dose is matched to indication/reason for exam: i.e. extremities or head);  iterative reconstruction technique.    COMPARISON:  None    FINDINGS:  Aortic atherosclerosis is noted.  Lumbar degenerative disc disease and facet arthritis.  Mild bilateral sacroiliitis.    Generalized demineralization suggests osteopenia.  Sagittal reformat reveals a questionable chronic S1/coccyx fracture.    A left subcapital femoral neck fracture is present with superior displacement of the intertrochanteric femur with respect to the femoral head.    The acetabulum is intact.  The pubic rami are intact.    The right femoral neck is normal.                               CT Head Without Contrast (Final result)  Result time 09/20/18 09:19:13    Final result by Liang Sunshine MD (09/20/18 09:19:13)                 Impression:      Moderately advanced age-related atrophy.  No acute findings.      Electronically signed by: Liagn Sunshine MD  Date:    09/20/2018  Time:    09:19             Narrative:    EXAMINATION:  CT HEAD WITHOUT CONTRAST    CLINICAL HISTORY:  Head injury with headache after a fall.    TECHNIQUE:  Standard noncontrast CT of the brain. All CT scans at this facility use dose modulation, iterative reconstruction and/or weight based dosing when appropriate to reduce radiation dose to as low as reasonably achievable.    COMPARISON:  None    FINDINGS:  The ventricles are moderately enlarged consistent with volume loss.  No acute edema, hemorrhage or mass effect is present.    Intracranial vascular calcification is noted.    No  definite intracranial hemorrhage or subdural hematoma.    The skull appears grossly negative with no evidence of acute fracture.                               X-Ray Chest 1 View (Final result)  Result time 09/20/18 09:08:03   Procedure changed from X-Ray Chest PA And Lateral     Final result by Liang Sunshine MD (09/20/18 09:08:03)                 Impression:      No definite acute finding.      Electronically signed by: Liang Sunshine MD  Date:    09/20/2018  Time:    09:08             Narrative:    EXAMINATION:  XR CHEST 1 VIEW    CLINICAL HISTORY:  Chest trauma with chest pain due to a fall., Fall;    COMPARISON:  None    FINDINGS:  Cardiac size is normal.  Aorta is mildly enlarged and calcified as well as elongated.    The lung fields appear grossly clear.    The patient is rotated to the right.  Mild thoracic spondylosis.                               X-Ray Cervical Spine AP And Lateral (Final result)  Result time 09/20/18 09:13:12    Final result by KATHIE German Sr., MD (09/20/18 09:13:12)                 Impression:      1. There is grade 1 anterolisthesis of C3 on C4.  2. There are mild degenerative changes between C4 and C7.  .      Electronically signed by: Casey German MD  Date:    09/20/2018  Time:    09:13             Narrative:    EXAMINATION:  XR CERVICAL SPINE AP LATERAL    CLINICAL HISTORY:  Unspecified fall, initial encounter    COMPARISON:  A plain film examination of the thoracic spine performed on 09/20/2018.    FINDINGS:  There is grade 1 anterolisthesis of C3 on C4.  There is no fracture or scoliosis.  There is normal cervical lordosis.  There are mild degenerative changes between C4 and C7.  The prevertebral soft tissue space is normal in appearance.                               X-Ray Thoracic Spine AP Lateral (Final result)  Result time 09/20/18 09:18:20    Final result by KATHIE German Sr., MD (09/20/18 09:18:20)                 Impression:      1. The superior aspect of  the thoracic spine is slightly tilted towards the right.  This may be positional.  2. There are mild degenerative changes in the thoracic spine.  3. There is an old-appearing mild anterior wedge defect/fracture of the T12 vertebral body.      Electronically signed by: Casey German MD  Date:    09/20/2018  Time:    09:18             Narrative:    EXAMINATION:  XR THORACIC SPINE AP LATERAL    COMPARISON:  A plain film examination of the cervical spine performed on 09/20/2018.  Comparison was also made to a plain film examination of the lumbar spine performed on 09/20/2018.    FINDINGS:  The superior aspect of the thoracic spine is slightly tilted towards the right.  There is an old-appearing mild anterior wedge defect/fracture of the T12 vertebral body.  There is no spondylolisthesis in the thoracic spine.  There is normal thoracic kyphosis.  There are mild degenerative changes in the thoracic spine.                               X-Ray Lumbar Spine Ap And Lateral (Final result)  Result time 09/20/18 09:10:10    Final result by KATHIE German Sr., MD (09/20/18 09:10:10)                 Impression:      1. There are mild degenerative changes between T12 and L4 vertebral bodies.  2. There is an old-appearing mild anterior wedge defect/fracture of the T12 vertebral body.  3. There is a moderate amount of atherosclerosis.  4. There is a prominent amount of fecal material within the colon.      Electronically signed by: Casey German MD  Date:    09/20/2018  Time:    09:10             Narrative:    EXAMINATION:  XR LUMBAR SPINE AP AND LATERAL    CLINICAL HISTORY:  T/L-spine trauma, minor-mod, low back pain;    COMPARISON:  None    FINDINGS:  There are 5 lumbar type vertebral bodies. There is no fracture, spondylolisthesis, or scoliosis of the lumbar spine.  There is normal lumbar lordosis.  There are mild degenerative changes between T12 and L4 vertebral bodies.  There is an old-appearing mild anterior wedge  defect/fracture of the T12 vertebral body.  There is a moderate amount of atherosclerosis.  There is a prominent amount of fecal material within the colon.                               X-Ray Knee 1 or 2 View Left (Final result)  Result time 09/20/18 09:07:09   Procedure changed from X-Ray Knee Complete 4 or More Views Left     Final result by KATHIE German Sr., MD (09/20/18 09:07:09)                 Impression:      1. No fracture or dislocation  2. There is a moderate amount of atherosclerosis.      Electronically signed by: Casey German MD  Date:    09/20/2018  Time:    09:07             Narrative:    EXAMINATION:  XR KNEE 1 OR 2 VIEW LEFT    CLINICAL HISTORY:  Pain in unspecified kneefall;    COMPARISON:  None    FINDINGS:  There is no fracture. There is no dislocation.  There is a moderate amount of atherosclerosis.                               X-Ray Hips Bilateral 2 View Incl AP Pelvis (Final result)  Result time 09/20/18 09:05:48    Final result by KATHIE German Sr., MD (09/20/18 09:05:48)                 Impression:      1. There is an acute appearing fracture through the left femoral neck.  2. There is a prominent amount of fecal material within the colon.  3. There is a mild amount of atherosclerosis.      Electronically signed by: Casey German MD  Date:    09/20/2018  Time:    09:05             Narrative:    EXAMINATION:  XR HIPS BILATERAL 2 VIEW INCL AP PELVIS    CLINICAL HISTORY:  Pain in unspecified hip    COMPARISON:  None    FINDINGS:  There is an acute appearing fracture through the left femoral neck.  The left femoral head still articulates with the left acetabulum.  There is a mild amount of atherosclerosis.  There is a prominent amount of fecal material within the colon.                                  Assessment/Plan:     * Closed fracture of left hip    -NPO for possible surgery.   -Dr. Peralta with Orthopedics has been consulted.   -Supportive care.               Hypertensive  urgency    -Resume home medications.   -IV Hydralazine as needed.        Leukocytosis    -Possibly reactive.   -Monitor.          Major depressive disorder    Continue Celexa.           Chronic UTI    -Continue Keflex for prophylaxis.   -Monitor for signs and symptoms of infection.         Dementia without behavioral disturbance    Supportive care.             VTE Risk Mitigation (From admission, onward)        Ordered     IP VTE HIGH RISK PATIENT  Once      09/20/18 1246     Place EVELYN hose  Until discontinued      09/20/18 1246             CAPRI Tom  Department of Hospital Medicine   [facility]

## 2018-09-20 NOTE — ASSESSMENT & PLAN NOTE
-NPO for possible surgery.   -Dr. Peralta with Orthopedics has been consulted.   -Supportive care.

## 2018-09-20 NOTE — ED NOTES
Pt lying down resting in bed asleep. SR up x 1. Bed locked and low. Call bell at side.  Will monitor.

## 2018-09-20 NOTE — SUBJECTIVE & OBJECTIVE
Past Medical History:   Diagnosis Date    Alzheimer disease     Chronic UTI     Essential (primary) hypertension     Glaucoma     Macular degeneration     Major depressive disorder     Renal disorder     Urinary tract infection        Past Surgical History:   Procedure Laterality Date    APPENDECTOMY      EYE SURGERY      cataract surger ou    HYSTERECTOMY         Review of patient's allergies indicates:  No Known Allergies    No current facility-administered medications on file prior to encounter.      Current Outpatient Medications on File Prior to Encounter   Medication Sig    aspirin 81 MG Chew Take 81 mg by mouth once daily.    cephALEXin (KEFLEX) 250 MG capsule Take 250 mg by mouth once daily.    citalopram (CELEXA) 10 MG tablet Take 10 mg by mouth 2 (two) times daily.    diltiaZEM (CARDIZEM CD) 240 MG 24 hr capsule Take 240 mg by mouth nightly.    latanoprost 0.005 % ophthalmic solution Place 1 drop into both eyes every evening.     loratadine (CLARITIN) 10 mg tablet Take 10 mg by mouth once daily.    losartan-hydrochlorothiazide 50-12.5 mg (HYZAAR) 50-12.5 mg per tablet Take 1 tablet by mouth once daily.    megestrol (MEGACE) 20 MG Tab Take 20 mg by mouth once daily.    melatonin 10 mg TbSR Take by mouth.    metoprolol tartrate (LOPRESSOR) 25 MG tablet Take 25 mg by mouth 2 (two) times daily.    [DISCONTINUED] citalopram (CELEXA) 10 MG tablet Take 10 mg by mouth once daily.    [DISCONTINUED] donepezil (ARICEPT) 5 MG tablet Take 5 mg by mouth every evening.    [DISCONTINUED] dorzolamide-timolol 2-0.5% (COSOPT) 22.3-6.8 mg/mL ophthalmic solution 1 drop 2 (two) times daily.     Family History     Reviewed and not pertinent.        Tobacco Use    Smoking status: Never Smoker    Smokeless tobacco: Never Used   Substance and Sexual Activity    Alcohol use: Not on file    Drug use: Not on file    Sexual activity: Not on file     Review of Systems   Unable to perform ROS: Dementia      Objective:     Vital Signs (Most Recent):  Temp: 98.1 °F (36.7 °C) (09/20/18 1403)  Pulse: 72 (09/20/18 1403)  Resp: 20 (09/20/18 1403)  BP: (!) 160/71 (09/20/18 1403)  SpO2: 99 % (09/20/18 1403) Vital Signs (24h Range):  Temp:  [98 °F (36.7 °C)-98.7 °F (37.1 °C)] 98.1 °F (36.7 °C)  Pulse:  [69-77] 72  Resp:  [18-21] 20  SpO2:  [96 %-99 %] 99 %  BP: (160-200)/(62-79) 160/71        There is no height or weight on file to calculate BMI.    Physical Exam   Constitutional: She appears well-developed and well-nourished. No distress.   HENT:   Head: Normocephalic and atraumatic.   Eyes: Conjunctivae are normal.   PERRL   Neck: Neck supple. No JVD present.   Cardiovascular: Normal rate, regular rhythm and normal heart sounds.   Pulmonary/Chest: Effort normal and breath sounds normal.   Abdominal: Soft. Bowel sounds are normal. She exhibits no distension. There is no tenderness.   Musculoskeletal: Normal range of motion.   Neurological: She is alert. She is disoriented.   Skin: Skin is warm and dry.   Small bruises to bilateral lower extremities.   Psychiatric: Her behavior is normal. Thought content normal. Her mood appears anxious. Cognition and memory are impaired. She expresses impulsivity.   Nursing note and vitals reviewed.          Significant Labs:   CBC:   Recent Labs   Lab  09/20/18   0825   WBC  18.39*   HGB  12.9   HCT  37.8   PLT  269     CMP:   Recent Labs   Lab  09/20/18   0825   NA  135*   K  3.9   CL  100   CO2  24   GLU  142*   BUN  22   CREATININE  0.8   CALCIUM  9.4   ANIONGAP  11   EGFRNONAA  >60     All pertinent labs within the past 24 hours have been reviewed.    Significant Imaging: I have reviewed all pertinent imaging results/findings within the past 24 hours.   Imaging Results          CT Hip Without Contrast Left (Final result)  Result time 09/20/18 09:22:12    Final result by Liang Sunshine MD (09/20/18 09:22:12)                 Impression:      Acute subcapital left femoral neck  fracture with displacement.    Underlying osteopenia.    Possible chronic S1 sacral fracture.    Sacroiliitis.  Lumbar degenerative disc disease and facet arthritis.    Incidental demonstration of sigmoid diverticulosis.      Electronically signed by: Liang Sunshine MD  Date:    09/20/2018  Time:    09:22             Narrative:    EXAMINATION:  CT HIP WITHOUT CONTRAST LEFT    CLINICAL HISTORY:  Trauma with left femoral neck fracture.  >    TECHNIQUE:  CT of the pelvis without contrast with coronal and sagittal reformats.  All CT scans at this facility are performed  using dose modulation techniques as appropriate to performed exam including the following:  automated exposure control; adjustment of mA and/or kV according to the patients size (this includes techniques or standardized protocols for targeted exams where dose is matched to indication/reason for exam: i.e. extremities or head);  iterative reconstruction technique.    COMPARISON:  None    FINDINGS:  Aortic atherosclerosis is noted.  Lumbar degenerative disc disease and facet arthritis.  Mild bilateral sacroiliitis.    Generalized demineralization suggests osteopenia.  Sagittal reformat reveals a questionable chronic S1/coccyx fracture.    A left subcapital femoral neck fracture is present with superior displacement of the intertrochanteric femur with respect to the femoral head.    The acetabulum is intact.  The pubic rami are intact.    The right femoral neck is normal.                               CT Head Without Contrast (Final result)  Result time 09/20/18 09:19:13    Final result by Liang Sunshine MD (09/20/18 09:19:13)                 Impression:      Moderately advanced age-related atrophy.  No acute findings.      Electronically signed by: Liang Sunshine MD  Date:    09/20/2018  Time:    09:19             Narrative:    EXAMINATION:  CT HEAD WITHOUT CONTRAST    CLINICAL HISTORY:  Head injury with headache after a  fall.    TECHNIQUE:  Standard noncontrast CT of the brain. All CT scans at this facility use dose modulation, iterative reconstruction and/or weight based dosing when appropriate to reduce radiation dose to as low as reasonably achievable.    COMPARISON:  None    FINDINGS:  The ventricles are moderately enlarged consistent with volume loss.  No acute edema, hemorrhage or mass effect is present.    Intracranial vascular calcification is noted.    No definite intracranial hemorrhage or subdural hematoma.    The skull appears grossly negative with no evidence of acute fracture.                               X-Ray Chest 1 View (Final result)  Result time 09/20/18 09:08:03   Procedure changed from X-Ray Chest PA And Lateral     Final result by Liang Sunshine MD (09/20/18 09:08:03)                 Impression:      No definite acute finding.      Electronically signed by: Liang Sunshine MD  Date:    09/20/2018  Time:    09:08             Narrative:    EXAMINATION:  XR CHEST 1 VIEW    CLINICAL HISTORY:  Chest trauma with chest pain due to a fall., Fall;    COMPARISON:  None    FINDINGS:  Cardiac size is normal.  Aorta is mildly enlarged and calcified as well as elongated.    The lung fields appear grossly clear.    The patient is rotated to the right.  Mild thoracic spondylosis.                               X-Ray Cervical Spine AP And Lateral (Final result)  Result time 09/20/18 09:13:12    Final result by KATHIE German Sr., MD (09/20/18 09:13:12)                 Impression:      1. There is grade 1 anterolisthesis of C3 on C4.  2. There are mild degenerative changes between C4 and C7.  .      Electronically signed by: Casey German MD  Date:    09/20/2018  Time:    09:13             Narrative:    EXAMINATION:  XR CERVICAL SPINE AP LATERAL    CLINICAL HISTORY:  Unspecified fall, initial encounter    COMPARISON:  A plain film examination of the thoracic spine performed on 09/20/2018.    FINDINGS:  There is  grade 1 anterolisthesis of C3 on C4.  There is no fracture or scoliosis.  There is normal cervical lordosis.  There are mild degenerative changes between C4 and C7.  The prevertebral soft tissue space is normal in appearance.                               X-Ray Thoracic Spine AP Lateral (Final result)  Result time 09/20/18 09:18:20    Final result by KATHIE German Sr., MD (09/20/18 09:18:20)                 Impression:      1. The superior aspect of the thoracic spine is slightly tilted towards the right.  This may be positional.  2. There are mild degenerative changes in the thoracic spine.  3. There is an old-appearing mild anterior wedge defect/fracture of the T12 vertebral body.      Electronically signed by: Casey German MD  Date:    09/20/2018  Time:    09:18             Narrative:    EXAMINATION:  XR THORACIC SPINE AP LATERAL    COMPARISON:  A plain film examination of the cervical spine performed on 09/20/2018.  Comparison was also made to a plain film examination of the lumbar spine performed on 09/20/2018.    FINDINGS:  The superior aspect of the thoracic spine is slightly tilted towards the right.  There is an old-appearing mild anterior wedge defect/fracture of the T12 vertebral body.  There is no spondylolisthesis in the thoracic spine.  There is normal thoracic kyphosis.  There are mild degenerative changes in the thoracic spine.                               X-Ray Lumbar Spine Ap And Lateral (Final result)  Result time 09/20/18 09:10:10    Final result by KATHIE German Sr., MD (09/20/18 09:10:10)                 Impression:      1. There are mild degenerative changes between T12 and L4 vertebral bodies.  2. There is an old-appearing mild anterior wedge defect/fracture of the T12 vertebral body.  3. There is a moderate amount of atherosclerosis.  4. There is a prominent amount of fecal material within the colon.      Electronically signed by: Casey German  MD  Date:    09/20/2018  Time:    09:10             Narrative:    EXAMINATION:  XR LUMBAR SPINE AP AND LATERAL    CLINICAL HISTORY:  T/L-spine trauma, minor-mod, low back pain;    COMPARISON:  None    FINDINGS:  There are 5 lumbar type vertebral bodies. There is no fracture, spondylolisthesis, or scoliosis of the lumbar spine.  There is normal lumbar lordosis.  There are mild degenerative changes between T12 and L4 vertebral bodies.  There is an old-appearing mild anterior wedge defect/fracture of the T12 vertebral body.  There is a moderate amount of atherosclerosis.  There is a prominent amount of fecal material within the colon.                               X-Ray Knee 1 or 2 View Left (Final result)  Result time 09/20/18 09:07:09   Procedure changed from X-Ray Knee Complete 4 or More Views Left     Final result by KATHIE German Sr., MD (09/20/18 09:07:09)                 Impression:      1. No fracture or dislocation  2. There is a moderate amount of atherosclerosis.      Electronically signed by: Casey German MD  Date:    09/20/2018  Time:    09:07             Narrative:    EXAMINATION:  XR KNEE 1 OR 2 VIEW LEFT    CLINICAL HISTORY:  Pain in unspecified kneefall;    COMPARISON:  None    FINDINGS:  There is no fracture. There is no dislocation.  There is a moderate amount of atherosclerosis.                               X-Ray Hips Bilateral 2 View Incl AP Pelvis (Final result)  Result time 09/20/18 09:05:48    Final result by KATHIE German Sr., MD (09/20/18 09:05:48)                 Impression:      1. There is an acute appearing fracture through the left femoral neck.  2. There is a prominent amount of fecal material within the colon.  3. There is a mild amount of atherosclerosis.      Electronically signed by: Casey German MD  Date:    09/20/2018  Time:    09:05             Narrative:    EXAMINATION:  XR HIPS BILATERAL 2 VIEW INCL AP PELVIS    CLINICAL HISTORY:  Pain in unspecified  hip    COMPARISON:  None    FINDINGS:  There is an acute appearing fracture through the left femoral neck.  The left femoral head still articulates with the left acetabulum.  There is a mild amount of atherosclerosis.  There is a prominent amount of fecal material within the colon.

## 2018-09-21 NOTE — ANESTHESIA PREPROCEDURE EVALUATION
09/21/2018  Rosalina Thorpe is a 89 y.o., female.    Pre-op Assessment    I have reviewed the Patient Summary Reports.     I have reviewed the Nursing Notes.   I have reviewed the Medications.     Review of Systems  Anesthesia Hx:  No problems with previous Anesthesia  History of prior surgery of interest to airway management or planning: Denies Family Hx of Anesthesia complications.   Denies Personal Hx of Anesthesia complications.   Social:  Non-Smoker    Hematology/Oncology:     Oncology Normal    -- Anemia: Hematology Comments: Leucocytosis    EENT/Dental:   glaucoma   Cardiovascular:   Hypertension    Pulmonary:  Pulmonary Normal    Renal/:   Chronic Renal Disease Chronic UTI   Hepatic/GI:  Hepatic/GI Normal    Neurological:   alzheimers   Endocrine:  Endocrine Normal    Psych:   Psychiatric History depression          Physical Exam  General:  Cachexia    Airway/Jaw/Neck:  Airway Findings: Mouth Opening: Normal Tongue: Normal  General Airway Assessment: Adult  Mallampati: II  TM Distance: Normal, at least 6 cm          Chest/Lungs:  Chest/Lungs Findings: Normal Respiratory Rate     Heart/Vascular:  Heart Findings: Rate: Normal             Anesthesia Plan  Type of Anesthesia, risks & benefits discussed:  Anesthesia Type:  general  Patient's Preference:   Intra-op Monitoring Plan: standard ASA monitors  Intra-op Monitoring Plan Comments:   Post Op Pain Control Plan: multimodal analgesia  Post Op Pain Control Plan Comments:   Induction:   IV  Beta Blocker:  Patient is on a Beta-Blocker and has received one dose within the past 24 hours (No further documentation required).       Informed Consent: Patient understands risks and agrees with Anesthesia plan.  Questions answered. Anesthesia consent signed with patient.  ASA Score: 2     Day of Surgery Review of History & Physical:    H&P update referred to the  surgeon.     Anesthesia Plan Notes: DNR is on hold in OR per Bharath vuong.

## 2018-09-21 NOTE — HOSPITAL COURSE
Patient admitted for closed fracture of left hip. CT scan of the hip was performed and confirmed a left subcapital femoral neck fracture with superior displacement of the intertrochanteric femur with respect to the femoral head. Orthopedics consulted. Left hip hemiarthroplasty planned for tomorrow. Patient s/p left hip hemiarthroplasty for femoral neck fracture on 9/22/18 per Dr. Bueno. Patient tolerated procedure well. VSS. Pain controlled. PT/OT consult. ASA BID for DVT prophylaxis. POD #1, H/H stable, K+3.3, will replete. Social work consult for discharge planning (SNF). POD #2, H/H 8.9/26.8, will transfuse 1 unit of PRBCs. Will replete K+. Patient  coughing with sips of water. Will consult ST for swallow evaluation. Awaiting SNF placement. ST recommends mechanical soft and honey thick liquids. POD #3, H/H stable. Patient accepted at the [facility]. Home meds reconciled. Patient to follow-up with PCP in 3 days for hospital follow-up. Patient also to follow-up with Dr. Bueno (Orthopedic) for post-op follow-up in 2 weeks. Patient seen and examined on the date of discharge and found suitable for discharge.

## 2018-09-21 NOTE — PROGRESS NOTES
Ortho Update:    - plan is for hemiarthroplasty Sat 6am  - ok to eat  - nwb  - hold lovenox until postop  - scds  - pt/ot  - pain control

## 2018-09-21 NOTE — SUBJECTIVE & OBJECTIVE
Interval History: No acute events overnight. Patient resting quietly easily aroused. Ortho following. Surgery planned in [place].     Review of Systems   Unable to perform ROS: Dementia     Objective:     Vital Signs (Most Recent):  Temp: 98.6 °F (37 °C) (09/21/18 1104)  Pulse: 76 (09/21/18 1330)  Resp: 18 (09/21/18 1104)  BP: (!) 155/64 (09/21/18 1104)  SpO2: (!) 93 % (09/21/18 0745) Vital Signs (24h Range):  Temp:  [98 °F (36.7 °C)-99.6 °F (37.6 °C)] 98.6 °F (37 °C)  Pulse:  [64-91] 76  Resp:  [12-22] 18  SpO2:  [91 %-96 %] 93 %  BP: (144-159)/(64-70) 155/64     Weight: 47 kg (103 lb 9.9 oz)  Body mass index is 18.35 kg/m².    Intake/Output Summary (Last 24 hours) at 9/21/2018 1534  Last data filed at 9/21/2018 0500  Gross per 24 hour   Intake 942.5 ml   Output 1600 ml   Net -657.5 ml      Physical Exam   Constitutional: She appears well-developed and well-nourished. No distress.   HENT:   Head: Normocephalic and atraumatic.   Eyes: Conjunctivae are normal.   PERRL   Neck: Neck supple. No JVD present.   Cardiovascular: Normal rate, regular rhythm and normal heart sounds.   Pulses:       Dorsalis pedis pulses are 2+ on the right side, and 2+ on the left side.        Posterior tibial pulses are 2+ on the right side, and 2+ on the left side.   Pulmonary/Chest: Effort normal and breath sounds normal.   Abdominal: Soft. Bowel sounds are normal. She exhibits no distension. There is no tenderness.   Musculoskeletal:        Left hip: She exhibits decreased range of motion and tenderness.   Neurological: She is alert. She is disoriented.   Skin: Skin is warm and dry.   Small bruises to bilateral lower extremities.  + Left hip bruising   Psychiatric: Her behavior is normal. Thought content normal. Her mood appears anxious. Cognition and memory are impaired. She expresses impulsivity.   Nursing note and vitals reviewed.      Significant Labs:   BMP:   Recent Labs   Lab  09/21/18   0733   GLU  98   NA  134*   K  3.8   CL  104    CO2  23   BUN  21   CREATININE  0.7   CALCIUM  8.4*   MG  1.9     CBC:   Recent Labs   Lab  09/20/18   0825  09/21/18   0733   WBC  18.39*  14.24*   HGB  12.9  10.5*   HCT  37.8  31.7*   PLT  269  233     CMP:   Recent Labs   Lab  09/20/18   0825  09/21/18   0733   NA  135*  134*   K  3.9  3.8   CL  100  104   CO2  24  23   GLU  142*  98   BUN  22  21   CREATININE  0.8  0.7   CALCIUM  9.4  8.4*   ANIONGAP  11  7*   EGFRNONAA  >60  >60     All pertinent labs within the past 24 hours have been reviewed.    Significant Imaging:   Imaging Results          CT Hip Without Contrast Left (Final result)  Result time 09/20/18 09:22:12    Final result by Liang Sunshine MD (09/20/18 09:22:12)                 Impression:      Acute subcapital left femoral neck fracture with displacement.    Underlying osteopenia.    Possible chronic S1 sacral fracture.    Sacroiliitis.  Lumbar degenerative disc disease and facet arthritis.    Incidental demonstration of sigmoid diverticulosis.      Electronically signed by: Liang Sunshine MD  Date:    09/20/2018  Time:    09:22             Narrative:    EXAMINATION:  CT HIP WITHOUT CONTRAST LEFT    CLINICAL HISTORY:  Trauma with left femoral neck fracture.  >    TECHNIQUE:  CT of the pelvis without contrast with coronal and sagittal reformats.  All CT scans at this facility are performed  using dose modulation techniques as appropriate to performed exam including the following:  automated exposure control; adjustment of mA and/or kV according to the patients size (this includes techniques or standardized protocols for targeted exams where dose is matched to indication/reason for exam: i.e. extremities or head);  iterative reconstruction technique.    COMPARISON:  None    FINDINGS:  Aortic atherosclerosis is noted.  Lumbar degenerative disc disease and facet arthritis.  Mild bilateral sacroiliitis.    Generalized demineralization suggests osteopenia.  Sagittal reformat reveals a  questionable chronic S1/coccyx fracture.    A left subcapital femoral neck fracture is present with superior displacement of the intertrochanteric femur with respect to the femoral head.    The acetabulum is intact.  The pubic rami are intact.    The right femoral neck is normal.                               CT Head Without Contrast (Final result)  Result time 09/20/18 09:19:13    Final result by Liang Sunshine MD (09/20/18 09:19:13)                 Impression:      Moderately advanced age-related atrophy.  No acute findings.      Electronically signed by: Liang Sunshine MD  Date:    09/20/2018  Time:    09:19             Narrative:    EXAMINATION:  CT HEAD WITHOUT CONTRAST    CLINICAL HISTORY:  Head injury with headache after a fall.    TECHNIQUE:  Standard noncontrast CT of the brain. All CT scans at this facility use dose modulation, iterative reconstruction and/or weight based dosing when appropriate to reduce radiation dose to as low as reasonably achievable.    COMPARISON:  None    FINDINGS:  The ventricles are moderately enlarged consistent with volume loss.  No acute edema, hemorrhage or mass effect is present.    Intracranial vascular calcification is noted.    No definite intracranial hemorrhage or subdural hematoma.    The skull appears grossly negative with no evidence of acute fracture.                               X-Ray Chest 1 View (Final result)  Result time 09/20/18 09:08:03   Procedure changed from X-Ray Chest PA And Lateral     Final result by Liang Sunshine MD (09/20/18 09:08:03)                 Impression:      No definite acute finding.      Electronically signed by: Liang Sunshine MD  Date:    09/20/2018  Time:    09:08             Narrative:    EXAMINATION:  XR CHEST 1 VIEW    CLINICAL HISTORY:  Chest trauma with chest pain due to a fall., Fall;    COMPARISON:  None    FINDINGS:  Cardiac size is normal.  Aorta is mildly enlarged and calcified as well as elongated.    The  lung fields appear grossly clear.    The patient is rotated to the right.  Mild thoracic spondylosis.                               X-Ray Cervical Spine AP And Lateral (Final result)  Result time 09/20/18 09:13:12    Final result by KATHIE German Sr., MD (09/20/18 09:13:12)                 Impression:      1. There is grade 1 anterolisthesis of C3 on C4.  2. There are mild degenerative changes between C4 and C7.  .      Electronically signed by: Casey German MD  Date:    09/20/2018  Time:    09:13             Narrative:    EXAMINATION:  XR CERVICAL SPINE AP LATERAL    CLINICAL HISTORY:  Unspecified fall, initial encounter    COMPARISON:  A plain film examination of the thoracic spine performed on 09/20/2018.    FINDINGS:  There is grade 1 anterolisthesis of C3 on C4.  There is no fracture or scoliosis.  There is normal cervical lordosis.  There are mild degenerative changes between C4 and C7.  The prevertebral soft tissue space is normal in appearance.                               X-Ray Thoracic Spine AP Lateral (Final result)  Result time 09/20/18 09:18:20    Final result by KATHIE German Sr., MD (09/20/18 09:18:20)                 Impression:      1. The superior aspect of the thoracic spine is slightly tilted towards the right.  This may be positional.  2. There are mild degenerative changes in the thoracic spine.  3. There is an old-appearing mild anterior wedge defect/fracture of the T12 vertebral body.      Electronically signed by: Casey German MD  Date:    09/20/2018  Time:    09:18             Narrative:    EXAMINATION:  XR THORACIC SPINE AP LATERAL    COMPARISON:  A plain film examination of the cervical spine performed on 09/20/2018.  Comparison was also made to a plain film examination of the lumbar spine performed on 09/20/2018.    FINDINGS:  The superior aspect of the thoracic spine is slightly tilted towards the right.  There is an old-appearing mild anterior wedge defect/fracture of the  T12 vertebral body.  There is no spondylolisthesis in the thoracic spine.  There is normal thoracic kyphosis.  There are mild degenerative changes in the thoracic spine.                               X-Ray Lumbar Spine Ap And Lateral (Final result)  Result time 09/20/18 09:10:10    Final result by KATHIE German Sr., MD (09/20/18 09:10:10)                 Impression:      1. There are mild degenerative changes between T12 and L4 vertebral bodies.  2. There is an old-appearing mild anterior wedge defect/fracture of the T12 vertebral body.  3. There is a moderate amount of atherosclerosis.  4. There is a prominent amount of fecal material within the colon.      Electronically signed by: Casey German MD  Date:    09/20/2018  Time:    09:10             Narrative:    EXAMINATION:  XR LUMBAR SPINE AP AND LATERAL    CLINICAL HISTORY:  T/L-spine trauma, minor-mod, low back pain;    COMPARISON:  None    FINDINGS:  There are 5 lumbar type vertebral bodies. There is no fracture, spondylolisthesis, or scoliosis of the lumbar spine.  There is normal lumbar lordosis.  There are mild degenerative changes between T12 and L4 vertebral bodies.  There is an old-appearing mild anterior wedge defect/fracture of the T12 vertebral body.  There is a moderate amount of atherosclerosis.  There is a prominent amount of fecal material within the colon.                               X-Ray Knee 1 or 2 View Left (Final result)  Result time 09/20/18 09:07:09   Procedure changed from X-Ray Knee Complete 4 or More Views Left     Final result by KATHIE German Sr., MD (09/20/18 09:07:09)                 Impression:      1. No fracture or dislocation  2. There is a moderate amount of atherosclerosis.      Electronically signed by: Casey German MD  Date:    09/20/2018  Time:    09:07             Narrative:    EXAMINATION:  XR KNEE 1 OR 2 VIEW LEFT    CLINICAL HISTORY:  Pain in unspecified kneefall;    COMPARISON:  None    FINDINGS:  There is  no fracture. There is no dislocation.  There is a moderate amount of atherosclerosis.                               X-Ray Hips Bilateral 2 View Incl AP Pelvis (Final result)  Result time 09/20/18 09:05:48    Final result by KATHIE German Sr., MD (09/20/18 09:05:48)                 Impression:      1. There is an acute appearing fracture through the left femoral neck.  2. There is a prominent amount of fecal material within the colon.  3. There is a mild amount of atherosclerosis.      Electronically signed by: Casey German MD  Date:    09/20/2018  Time:    09:05             Narrative:    EXAMINATION:  XR HIPS BILATERAL 2 VIEW INCL AP PELVIS    CLINICAL HISTORY:  Pain in unspecified hip    COMPARISON:  None    FINDINGS:  There is an acute appearing fracture through the left femoral neck.  The left femoral head still articulates with the left acetabulum.  There is a mild amount of atherosclerosis.  There is a prominent amount of fecal material within the colon.

## 2018-09-21 NOTE — PLAN OF CARE
Problem: Patient Care Overview  Goal: Plan of Care Review  Outcome: Ongoing (interventions implemented as appropriate)  Pt has not complained of any pain. Unable to determine numbness/tingling due to pt disoriented x4. IV fluids are infusing. Pt is turn Q2. Fonseca is intact. No injuries. Will continue to monitor. 12 hour chart check is completed.

## 2018-09-21 NOTE — PROGRESS NOTES
[facility] Hospital Medicine  Progress Note    Patient Name: Rosalina Thorpe  MRN: [id number]  Patient Class: IP- Inpatient   Admission Date: 9/20/2018  Length of Stay: 1 days  Attending Physician: Myron Garcia MD  Primary Care Provider: Richar Block MD        Subjective:     Principal Problem:Closed fracture of left hip    HPI:  Rosalina Thorpe is a 89 year old female with hypertension and dementia who presented to the ED with complaints of left hip, left knee and back pain. Reportedly, the patient was found in her bed at the [facility] where she resides complaining of pain. There was not a witnessed fall and the patient's bed alarm did not activate. Due to dementia, the patient is unable to provide any history. Evaluation of the patient's pain in the ED included CT head, x-rays of her left knee, bilateral hips and spine. Due to findings of an acute fracture of the left femoral neck, a CT scan of the hip was performed and confirmed a left subcapital femoral neck fracture with superior displacement of the intertrochanteric femur with respect to the femoral head. Other imaging studies were negative for acute findings. Labs and EKG in the ED were unremarkable with the exception of an 18,390 WBC count. The patient's son, Tim Thorpe, is her medical power of  and the patient is a DNR.     Hospital Course:  Patient admitted for closed fracture of left hip. CT scan of the hip was performed and confirmed a left subcapital femoral neck fracture with superior displacement of the intertrochanteric femur with respect to the femoral head. Orthopedics consulted. Left hip hemiarthroplasty planned for tomorrow.             Interval History: No acute events overnight. Patient resting quietly easily aroused. Ortho following. Surgery planned in Am.     Review of Systems   Unable to perform ROS: Dementia     Objective:     Vital Signs (Most Recent):  Temp: 98.6 °F (37 °C) (09/21/18  1104)  Pulse: 76 (09/21/18 1330)  Resp: 18 (09/21/18 1104)  BP: (!) 155/64 (09/21/18 1104)  SpO2: (!) 93 % (09/21/18 0745) Vital Signs (24h Range):  Temp:  [98 °F (36.7 °C)-99.6 °F (37.6 °C)] 98.6 °F (37 °C)  Pulse:  [64-91] 76  Resp:  [12-22] 18  SpO2:  [91 %-96 %] 93 %  BP: (144-159)/(64-70) 155/64     Weight: 47 kg (103 lb 9.9 oz)  Body mass index is 18.35 kg/m².    Intake/Output Summary (Last 24 hours) at 9/21/2018 1534  Last data filed at 9/21/2018 0500  Gross per 24 hour   Intake 942.5 ml   Output 1600 ml   Net -657.5 ml      Physical Exam   Constitutional: She appears well-developed and well-nourished. No distress.   HENT:   Head: Normocephalic and atraumatic.   Eyes: Conjunctivae are normal.   PERRL   Neck: Neck supple. No JVD present.   Cardiovascular: Normal rate, regular rhythm and normal heart sounds.   Pulses:       Dorsalis pedis pulses are 2+ on the right side, and 2+ on the left side.        Posterior tibial pulses are 2+ on the right side, and 2+ on the left side.   Pulmonary/Chest: Effort normal and breath sounds normal.   Abdominal: Soft. Bowel sounds are normal. She exhibits no distension. There is no tenderness.   Musculoskeletal:        Left hip: She exhibits decreased range of motion and tenderness.   Neurological: She is alert. She is disoriented.   Skin: Skin is warm and dry.   Small bruises to bilateral lower extremities.  + Left hip bruising   Psychiatric: Her behavior is normal. Thought content normal. Her mood appears anxious. Cognition and memory are impaired. She expresses impulsivity.   Nursing note and vitals reviewed.      Significant Labs:   BMP:   Recent Labs   Lab  09/21/18   0733   GLU  98   NA  134*   K  3.8   CL  104   CO2  23   BUN  21   CREATININE  0.7   CALCIUM  8.4*   MG  1.9     CBC:   Recent Labs   Lab  09/20/18   0825  09/21/18   0733   WBC  18.39*  14.24*   HGB  12.9  10.5*   HCT  37.8  31.7*   PLT  269  233     CMP:   Recent Labs   Lab  09/20/18   0825  09/21/18    0733   NA  135*  134*   K  3.9  3.8   CL  100  104   CO2  24  23   GLU  142*  98   BUN  22  21   CREATININE  0.8  0.7   CALCIUM  9.4  8.4*   ANIONGAP  11  7*   EGFRNONAA  >60  >60     All pertinent labs within the past 24 hours have been reviewed.    Significant Imaging:   Imaging Results          CT Hip Without Contrast Left (Final result)  Result time 09/20/18 09:22:12    Final result by Liang Sunshine MD (09/20/18 09:22:12)                 Impression:      Acute subcapital left femoral neck fracture with displacement.    Underlying osteopenia.    Possible chronic S1 sacral fracture.    Sacroiliitis.  Lumbar degenerative disc disease and facet arthritis.    Incidental demonstration of sigmoid diverticulosis.      Electronically signed by: Liang Sunshine MD  Date:    09/20/2018  Time:    09:22             Narrative:    EXAMINATION:  CT HIP WITHOUT CONTRAST LEFT    CLINICAL HISTORY:  Trauma with left femoral neck fracture.  >    TECHNIQUE:  CT of the pelvis without contrast with coronal and sagittal reformats.  All CT scans at this facility are performed  using dose modulation techniques as appropriate to performed exam including the following:  automated exposure control; adjustment of mA and/or kV according to the patients size (this includes techniques or standardized protocols for targeted exams where dose is matched to indication/reason for exam: i.e. extremities or head);  iterative reconstruction technique.    COMPARISON:  None    FINDINGS:  Aortic atherosclerosis is noted.  Lumbar degenerative disc disease and facet arthritis.  Mild bilateral sacroiliitis.    Generalized demineralization suggests osteopenia.  Sagittal reformat reveals a questionable chronic S1/coccyx fracture.    A left subcapital femoral neck fracture is present with superior displacement of the intertrochanteric femur with respect to the femoral head.    The acetabulum is intact.  The pubic rami are intact.    The right femoral  neck is normal.                               CT Head Without Contrast (Final result)  Result time 09/20/18 09:19:13    Final result by Liang Sunshine MD (09/20/18 09:19:13)                 Impression:      Moderately advanced age-related atrophy.  No acute findings.      Electronically signed by: Liang Sunshine MD  Date:    09/20/2018  Time:    09:19             Narrative:    EXAMINATION:  CT HEAD WITHOUT CONTRAST    CLINICAL HISTORY:  Head injury with headache after a fall.    TECHNIQUE:  Standard noncontrast CT of the brain. All CT scans at this facility use dose modulation, iterative reconstruction and/or weight based dosing when appropriate to reduce radiation dose to as low as reasonably achievable.    COMPARISON:  None    FINDINGS:  The ventricles are moderately enlarged consistent with volume loss.  No acute edema, hemorrhage or mass effect is present.    Intracranial vascular calcification is noted.    No definite intracranial hemorrhage or subdural hematoma.    The skull appears grossly negative with no evidence of acute fracture.                               X-Ray Chest 1 View (Final result)  Result time 09/20/18 09:08:03   Procedure changed from X-Ray Chest PA And Lateral     Final result by Liang Sunshine MD (09/20/18 09:08:03)                 Impression:      No definite acute finding.      Electronically signed by: Liang Sunshine MD  Date:    09/20/2018  Time:    09:08             Narrative:    EXAMINATION:  XR CHEST 1 VIEW    CLINICAL HISTORY:  Chest trauma with chest pain due to a fall., Fall;    COMPARISON:  None    FINDINGS:  Cardiac size is normal.  Aorta is mildly enlarged and calcified as well as elongated.    The lung fields appear grossly clear.    The patient is rotated to the right.  Mild thoracic spondylosis.                               X-Ray Cervical Spine AP And Lateral (Final result)  Result time 09/20/18 09:13:12    Final result by KATHIE German Sr., MD  (09/20/18 09:13:12)                 Impression:      1. There is grade 1 anterolisthesis of C3 on C4.  2. There are mild degenerative changes between C4 and C7.  .      Electronically signed by: Casey German MD  Date:    09/20/2018  Time:    09:13             Narrative:    EXAMINATION:  XR CERVICAL SPINE AP LATERAL    CLINICAL HISTORY:  Unspecified fall, initial encounter    COMPARISON:  A plain film examination of the thoracic spine performed on 09/20/2018.    FINDINGS:  There is grade 1 anterolisthesis of C3 on C4.  There is no fracture or scoliosis.  There is normal cervical lordosis.  There are mild degenerative changes between C4 and C7.  The prevertebral soft tissue space is normal in appearance.                               X-Ray Thoracic Spine AP Lateral (Final result)  Result time 09/20/18 09:18:20    Final result by KATHIE German Sr., MD (09/20/18 09:18:20)                 Impression:      1. The superior aspect of the thoracic spine is slightly tilted towards the right.  This may be positional.  2. There are mild degenerative changes in the thoracic spine.  3. There is an old-appearing mild anterior wedge defect/fracture of the T12 vertebral body.      Electronically signed by: Casey German MD  Date:    09/20/2018  Time:    09:18             Narrative:    EXAMINATION:  XR THORACIC SPINE AP LATERAL    COMPARISON:  A plain film examination of the cervical spine performed on 09/20/2018.  Comparison was also made to a plain film examination of the lumbar spine performed on 09/20/2018.    FINDINGS:  The superior aspect of the thoracic spine is slightly tilted towards the right.  There is an old-appearing mild anterior wedge defect/fracture of the T12 vertebral body.  There is no spondylolisthesis in the thoracic spine.  There is normal thoracic kyphosis.  There are mild degenerative changes in the thoracic spine.                               X-Ray Lumbar Spine Ap And Lateral (Final result)  Result  time 09/20/18 09:10:10    Final result by KATHIE German Sr., MD (09/20/18 09:10:10)                 Impression:      1. There are mild degenerative changes between T12 and L4 vertebral bodies.  2. There is an old-appearing mild anterior wedge defect/fracture of the T12 vertebral body.  3. There is a moderate amount of atherosclerosis.  4. There is a prominent amount of fecal material within the colon.      Electronically signed by: Casey German MD  Date:    09/20/2018  Time:    09:10             Narrative:    EXAMINATION:  XR LUMBAR SPINE AP AND LATERAL    CLINICAL HISTORY:  T/L-spine trauma, minor-mod, low back pain;    COMPARISON:  None    FINDINGS:  There are 5 lumbar type vertebral bodies. There is no fracture, spondylolisthesis, or scoliosis of the lumbar spine.  There is normal lumbar lordosis.  There are mild degenerative changes between T12 and L4 vertebral bodies.  There is an old-appearing mild anterior wedge defect/fracture of the T12 vertebral body.  There is a moderate amount of atherosclerosis.  There is a prominent amount of fecal material within the colon.                               X-Ray Knee 1 or 2 View Left (Final result)  Result time 09/20/18 09:07:09   Procedure changed from X-Ray Knee Complete 4 or More Views Left     Final result by KATHIE German Sr., MD (09/20/18 09:07:09)                 Impression:      1. No fracture or dislocation  2. There is a moderate amount of atherosclerosis.      Electronically signed by: Casey German MD  Date:    09/20/2018  Time:    09:07             Narrative:    EXAMINATION:  XR KNEE 1 OR 2 VIEW LEFT    CLINICAL HISTORY:  Pain in unspecified kneefall;    COMPARISON:  None    FINDINGS:  There is no fracture. There is no dislocation.  There is a moderate amount of atherosclerosis.                               X-Ray Hips Bilateral 2 View Incl AP Pelvis (Final result)  Result time 09/20/18 09:05:48    Final result by KATHIE German Sr., MD  (09/20/18 09:05:48)                 Impression:      1. There is an acute appearing fracture through the left femoral neck.  2. There is a prominent amount of fecal material within the colon.  3. There is a mild amount of atherosclerosis.      Electronically signed by: Casey German MD  Date:    09/20/2018  Time:    09:05             Narrative:    EXAMINATION:  XR HIPS BILATERAL 2 VIEW INCL AP PELVIS    CLINICAL HISTORY:  Pain in unspecified hip    COMPARISON:  None    FINDINGS:  There is an acute appearing fracture through the left femoral neck.  The left femoral head still articulates with the left acetabulum.  There is a mild amount of atherosclerosis.  There is a prominent amount of fecal material within the colon.                              Assessment/Plan:      * Closed fracture of left hip    -Dr. Peralta with Orthopedics has been consulted.   -Left hemiarthroplasty plan for tomorrow   -NWB  -PT/OT   -Pain control             Leukocytosis    -Possibly reactive.   -Monitor  -Trending down             Hypertensive urgency    -Resume home medications.   -IV Hydralazine as needed.   -Bp better controlled           Major depressive disorder    Continue Celexa.           Chronic UTI    -Continue Keflex for prophylaxis.   -Monitor for signs and symptoms of infection.         Dementia without behavioral disturbance    Resume Risperdal    Monitor           VTE Risk Mitigation (From admission, onward)        Ordered     Place sequential compression device  Until discontinued      09/20/18 1508     IP VTE HIGH RISK PATIENT  Once      09/20/18 1246     Place EVELYN hose  Until discontinued      09/20/18 1246              Rena Pinzon NP  Department of Hospital Medicine   [facility]

## 2018-09-21 NOTE — ASSESSMENT & PLAN NOTE
-Dr. Peralta with Orthopedics has been consulted.   -Left hemiarthroplasty plan for tomorrow   -NWB  -PT/OT   -Pain control

## 2018-09-21 NOTE — PROGRESS NOTES
"  [facility]   Adult Nutrition  Progress Note    SUMMARY     Recommendations    Recommendation/Intervention: 1.Add low Na restriction to current diet.  2. If PO intake < 50 %, add boost plus TID. 3. Will continue to monitor.   Goals: Meet > 85 % EEN/EPN while admitted  Nutrition Goal Status: new  Communication of RD Recs: (POC, sticky note)    Reason for Assessment    Reason for Assessment: identified at risk by screening criteria  Dx:  1. Closed fracture of left hip, initial encounter    2. Hip pain    3. Knee pain    4. Back pain    5. Fall    6. Hypertension, unspecified type    7. Chronic UTI    8. Essential (primary) hypertension      Past Medical History:   Diagnosis Date    Alzheimer disease     Chronic UTI     Essential (primary) hypertension     Glaucoma     Macular degeneration     Major depressive disorder     Renal disorder     Urinary tract infection        General Information Comments: Pt w/ PMHx of dementia. Pt disoriented. Per Family, pt had good appetite and intake PTA. Family report no recent weight loss. Per epic records, no wt loss. Per physical assessment - No significant muscle/fat wasting noted. Diet just advanced this am.   Nutrition Discharge Planning: Low Na    Nutrition Risk Screen    Nutrition Risk Screen: other (see comments)(macular degeneration. unable to see food)    Nutrition/Diet History    Do you have any cultural, spiritual, Muslim conflicts, given your current situation?: no    Anthropometrics    Temp: 98 °F (36.7 °C)  Height: 5' 3" (160 cm)  Height (inches): 63 in  Weight Method: Bed Scale  Weight: 47 kg (103 lb 9.9 oz)  Weight (lb): 103.62 lb  Ideal Body Weight (IBW), Female: 115 lb  % Ideal Body Weight, Female (lb): 90.1 lb  BMI (Calculated): 18.4  BMI Grade: 17 - 18.4 protein-energy malnutrition grade I       Lab/Procedures/Meds    Pertinent Labs Reviewed: reviewed  BMP  Lab Results   Component Value Date     (L) 09/21/2018    K 3.8 09/21/2018 "     09/21/2018    CO2 23 09/21/2018    BUN 21 09/21/2018    CREATININE 0.7 09/21/2018    CALCIUM 8.4 (L) 09/21/2018    ANIONGAP 7 (L) 09/21/2018    ESTGFRAFRICA >60 09/21/2018    EGFRNONAA >60 09/21/2018     Lab Results   Component Value Date    CALCIUM 8.4 (L) 09/21/2018    PHOS 2.4 (L) 09/21/2018   \  Lab Results   Component Value Date    ALBUMIN 3.7 06/15/2018     No results for input(s): POCTGLUCOSE in the last 24 hours..    Pertinent Medications Reviewed: reviewed    Physical Findings/Assessment    Overall Physical Appearance: (thin)  Oral/Mouth Cavity: (WDL)  Skin: (Juarez score 13)    Estimated/Assessed Needs    Weight Used For Calorie Calculations: 47 kg (103 lb 9.9 oz)  Energy Calorie Requirements (kcal): 1645 - 1880  Energy Need Method: Kcal/kg  Protein Requirements: 56 g  Weight Used For Protein Calculations: 47 kg (103 lb 9.9 oz)     Fluid Need Method: RDA Method(or per MD)  RDA Method (mL): 1645         Nutrition Prescription Ordered    Current Diet Order: Regular diet     Evaluation of Received Nutrient/Fluid Intake          Intake/Output Summary (Last 24 hours) at 9/21/2018 1033  Last data filed at 9/21/2018 0500  Gross per 24 hour   Intake 942.5 ml   Output 1600 ml   Net -657.5 ml       % Intake of Estimated Energy Needs: Other: Diet just advanced  % Meal Intake: Other: Diet just advanced    Nutrition Risk      2xweekly    Assessment and Plan      Nutrition Problem  Nutrient decreased needs (sodium)    Related to (etiology):   Past medical hx    Signs and Symptoms (as evidenced by):   Hx of HTN    Interventions/Recommendations (treatment strategy):  See above    Nutrition Diagnosis Status:   New      Monitor and Evaluation    Food and Nutrient Intake: energy intake, food and beverage intake  Food and Nutrient Adminstration: diet order  Anthropometric Measurements: weight  Biochemical Data, Medical Tests and Procedures: electrolyte and renal panel, glucose/endocrine profile  Nutrition-Focused  Physical Findings: overall appearance     Nutrition Follow-Up    RD Follow-up?: Yes(2xweekly)

## 2018-09-21 NOTE — PLAN OF CARE
Problem: Patient Care Overview  Goal: Plan of Care Review  Outcome: Ongoing (interventions implemented as appropriate)  Recommendations     Recommendation/Intervention: 1.Add low Na restriction to current diet.  2. If PO intake < 50 %, add boost plus TID. 3. Will continue to monitor.   Goals: Meet > 85 % EEN/EPN while admitted  Nutrition Goal Status: new  Communication of RD Recs: (POC, sticky note)

## 2018-09-21 NOTE — PLAN OF CARE
Assessment completed.  Met with the patient/ family. CM explained and left info in blue transition of care folder regarding Advance Directives, Living Will (DNR) ,  Pamphlet on D/C planning on admission and Pharmacy bedside delivery.  The role of CM explained for Itransitions of care/ discharge planning. Patient admitted dur to fall at the facility and  plan is for hemiarthroplasty Sat   Patient lives at [facility] unit. Patient will need possibly skilled care  Vs HH. CM to f/u for safe transition    Richar Block MD     No Pharmacies Listed         09/21/18 1330   Discharge Assessment   Assessment Type Discharge Planning Assessment   Confirmed/corrected address and phone number on facesheet? Yes   Assessment information obtained from? Caregiver;Medical Record   Expected Length of Stay (days) (TBD)   Communicated expected length of stay with patient/caregiver no   Prior to hospitilization cognitive status: Not Oriented to Place;Not Oriented to Time   Prior to hospitalization functional status: Assistive Equipment   Current cognitive status: Not Oriented to Place;Not Oriented to Time   Current Functional Status: Needs Assistance   Lives With other (see comments)  ([facility] care unit)   Able to Return to Prior Arrangements unable to determine at this time (comments)   Is patient able to care for self after discharge? Unable to determine at this time (comments)   Patient's perception of discharge disposition rehab facility   Readmission Within The Last 30 Days no previous admission in last 30 days   Patient currently being followed by outpatient case management? Unable to determine (comments)   Equipment Currently Used at Home walker, rolling   Do you have any problems affording any of your prescribed medications? No   Is the patient taking medications as prescribed? yes   Does the patient have transportation home? Yes   Transportation Available family or friend will provide   Does the patient  receive services at the [facility]? No   Discharge Plan A Skilled Nursing Facility   Patient/Family In Agreement With Plan yes

## 2018-09-21 NOTE — PLAN OF CARE
Problem: Patient Care Overview  Goal: Plan of Care Review  Outcome: Ongoing (interventions implemented as appropriate)    Reviewed plan of care, including indications and possible side effects of administered medications. Remains free from injury at this time. Respirations even and unlabored. Bed locked and in lowest position. Call light within reach. Side rails up x2. Pt has no complaints of pain. Telemetry monitor in place. Pt running NSR. Disoriented x 3. 2L O2 via NC. Chart check complete.

## 2018-09-21 NOTE — PLAN OF CARE
09/21/18 1335   Medicare Message   Important Message from Medicare regarding Discharge Appeal Rights Given to patient/caregiver;Explained to patient/caregiver;Signed/date by patient/caregiver   Date IMM was signed 09/21/18   Time IMM was signed 1328

## 2018-09-22 PROBLEM — Z96.649 S/P HIP HEMIARTHROPLASTY: Status: ACTIVE | Noted: 2018-01-01

## 2018-09-22 NOTE — PLAN OF CARE
Problem: Patient Care Overview  Goal: Plan of Care Review  Outcome: Ongoing (interventions implemented as appropriate)  Patient remained free from injury. Tele-sitter in use. Cardiac monitored (NSR) NPO status; Pre-op checklist complete. No s/s of acute distress. 12hr chart check complete.

## 2018-09-22 NOTE — OP NOTE
Operative Report  Surgery: 9/22/2018    Surgeon: Hi Bueno M.D.    Assistants:  none    Preoperative Diagnosis:  Left femoral neck fracture    Postoperative Diagnosis:  same    Procedure:  Left hip hemiarthroplasty for femoral neck fracture    EBL:  200cc    Drains: none    Implants:  [employer] size 12 stem, -3.5 neck 43 mm head    Indications:  Patient is status post fall with displaced femoral neck fracture.  I recommended conversion to hip hemiarthroplasty. Risks, benefits, and alternatives to surgery were discussed.  Specific risks including pain, bleeding, infection, fracture, hematoma, nerve/vessel injury, stiffness, arthritis, loosening, leg length difference, dislocation, and need for additional procedures were all discussed in great detail.  Informed consent was obtained and I marked them prior to transfer to the OR.    Procedure In Detail: After induction of anesthesia patient was positioned in the lateral decubitus position, all bony prominences were appropriately padded.  Operative extremity was prepped and draped in the standard fashion starting with chlorhexidine and alcohol pre-scrubs, chloraprep was then used on the skin.  All skin was covered with drape or ioban.  Appropriate timeout was performed prior to start of procedure confirming correct patient, side, site and procedure to be performed.  IV antibiotics were administered prior to start.  IV TXA was administered.     I began by making a standrad modified Hardinge approach to the hip.  Skin was incised along the lateral thigh in line with the top of the femur centered over the trochanter tip.  Skin and subcutaneous tissues were divided.  IT band was split in line with the incision.  The anterior 1/3 of the abductors and vastus were elevated off of the greater trochanter as one continuous sleeve.  Anterior capsulotomy was performed and fracture site encountered.  Proximal femur was delivered with flexion, adduction, and external rotation.   Cut was made using the template guide from the set.  Thee head was then extracted uneventfully.  Labrum was protected.  The head was sized and a trial was inserted into the acetabulum and had good suction fit and appeared appropriately sized.  The femur was then delivered again and prepared in the appropriate fashion and broached up to the appropriate size.  Head was reduced with trial implants and the hip appeared stable in extension and external rotation, flexion and internal rotation and with axial load.  Soft tissue tension appeared appropriate.  Hip was dislocated, broach removed, final implants impacted.  Hip was reduce and verified stable again, as above. Wound was irrigated.    Exparel cocktail was injected into the capsule, periosteum, surrounding musculature and subcutaneous tissues.  Vancomycin 1g was placed in the wound.  Capsule, vastus/abductor cuff, and IT band were repaired with heavy absorbable suture. Drain was not placed before closing the IT band. Subcutaneous tissues reapproximated with interrupted suture and absorbable quill-style monofilament stitch was used in a running subcuticular fashion.  Dermabond was applied over this with a sterile dressing over this.      All counts were correct at the end of the case.  No complications were noted.  I was present an performed all portions of the procedure.    Postoperative Plan:   Weightbearing status:  WBAT  Hip precautions: anterior  Antibiotics: periop only  Follow-up: 2 weeks.  Dressing change POD4 - dry sterile gauze.

## 2018-09-22 NOTE — ASSESSMENT & PLAN NOTE
-Dr. Peralta with Orthopedics has been consulted.   -Left hemiarthroplasty plan for tomorrow   -S/p Left hip hemiarthroplasty for femoral neck fracture  -PT/OT consult  - Monitor hemoglobin  -social service consult for discharge planning   -analgesia prn   -ASA for DVT prophylaxis

## 2018-09-22 NOTE — ANESTHESIA RELEASE NOTE
"Anesthesia Release from PACU Note    Patient: Rosalina Thorpe    Procedure(s) Performed: Procedure(s) (LRB):  ARTHROPLASTY, HIP (Left)    Anesthesia type: general    Post pain: Adequate analgesia    Post assessment: no apparent anesthetic complications and tolerated procedure well    Last Vitals:   Visit Vitals  BP (!) 150/65 (BP Location: Left arm, Patient Position: Lying)   Pulse 63   Temp 36.9 °C (98.4 °F) (Axillary)   Resp (!) 22   Ht 5' 3" (1.6 m)   Wt 47 kg (103 lb 9.9 oz)   LMP  (LMP Unknown)   SpO2 (!) 91%   Breastfeeding? No   BMI 18.35 kg/m²       Post vital signs: stable    Level of consciousness: awake    Nausea/Vomiting: no nausea/no vomiting    Complications: none    Airway Patency: patent    Respiratory: unassisted    Cardiovascular: stable and blood pressure at baseline    Hydration: euvolemic  "

## 2018-09-22 NOTE — ANESTHESIA POSTPROCEDURE EVALUATION
"Anesthesia Post Evaluation    Patient: Rosalina Thorpe    Procedure(s) Performed: Procedure(s) (LRB):  ARTHROPLASTY, HIP (Left)    Final Anesthesia Type: general  Patient location during evaluation: PACU  Patient participation: Yes- Able to Participate  Level of consciousness: awake and alert  Post-procedure vital signs: reviewed and stable  Pain management: adequate  Airway patency: patent  PONV status at discharge: No PONV  Anesthetic complications: no      Cardiovascular status: blood pressure returned to baseline  Respiratory status: unassisted  Hydration status: euvolemic  Follow-up not needed.        Visit Vitals  BP (!) 150/65 (BP Location: Left arm, Patient Position: Lying)   Pulse 63   Temp 36.9 °C (98.4 °F) (Axillary)   Resp (!) 22   Ht 5' 3" (1.6 m)   Wt 47 kg (103 lb 9.9 oz)   LMP  (LMP Unknown)   SpO2 (!) 91%   Breastfeeding? No   BMI 18.35 kg/m²       Pain/Alfreda Score: Pain Assessment Performed: Yes (9/22/2018  9:54 AM)  Presence of Pain: non-verbal indicators absent (9/22/2018  9:54 AM)  Alfreda Score: 8 (9/22/2018  9:54 AM)        "

## 2018-09-22 NOTE — PT/OT/SLP PROGRESS
Physical Therapy  PT Eval Initiated      Patient Name:  Rosalina Thopre   MRN:  [id number]    PT eval initiated via Epic chart review and speaking with nurse Tracey.  At 1400, nurse stated that patient was fast asleep s/p surgery and not attempting to eat or drink anything.  Nurse recommended to hold therapy evaluation until tomorrow morning due to patient's level of arousal.  Will complete PT eval at next visit tomorrow.    Kaci Alarcon, PT, DPT  9/22/2018  1400

## 2018-09-22 NOTE — PT/OT/SLP PROGRESS
Occupational Therapy      Patient Name:  Rosalina Thorpe   MRN:  [id number]  Eval initiated via chart review. Pt placed on hold per Nurse Webb . Will re-assess next day  Elaine Vargas OT  9/22/2018   [id number]

## 2018-09-22 NOTE — SUBJECTIVE & OBJECTIVE
Interval History: Pt. S/p left hip hemiarthroplasty for femoral neck fracture. Patient tolerated procedure well. Will monitor closely.         Review of Systems   Unable to perform ROS: Dementia     Objective:     Vital Signs (Most Recent):  Temp: 98.4 °F (36.9 °C) (09/22/18 0318)  Pulse: 63 (09/22/18 0318)  Resp: (!) 22 (09/22/18 0318)  BP: (!) 150/65 (09/22/18 0318)  SpO2: (!) 91 % (09/22/18 0318) Vital Signs (24h Range):  Temp:  [98.4 °F (36.9 °C)-99.7 °F (37.6 °C)] 98.4 °F (36.9 °C)  Pulse:  [63-84] 63  Resp:  [18-22] 22  SpO2:  [91 %-100 %] 91 %  BP: (130-155)/(60-67) 150/65     Weight: 47 kg (103 lb 9.9 oz)  Body mass index is 18.35 kg/m².    Intake/Output Summary (Last 24 hours) at 9/22/2018 0917  Last data filed at 9/22/2018 0656  Gross per 24 hour   Intake 1958.75 ml   Output 650 ml   Net 1308.75 ml      Physical Exam   Constitutional: She appears well-developed and well-nourished. No distress.   HENT:   Head: Normocephalic and atraumatic.   Eyes: Conjunctivae are normal.   PERRL   Neck: Neck supple. No JVD present.   Cardiovascular: Normal rate, regular rhythm and normal heart sounds.   Pulses:       Dorsalis pedis pulses are 2+ on the right side, and 2+ on the left side.        Posterior tibial pulses are 2+ on the right side, and 2+ on the left side.   Pulmonary/Chest: Effort normal and breath sounds normal.   Abdominal: Soft. Bowel sounds are normal. She exhibits no distension. There is no tenderness.   Musculoskeletal:        Left hip: She exhibits decreased range of motion and tenderness.        Legs:  Left Hip -dressing intact, 2 plus dorsalis pedis and posterior tibial artery pulses, light touch intact Left lower extremity.  All digits are warm. No erythema, no warmth, no drainage, no swelling, no significant tenderness.  Less than 2 seconds capillary refill all digits.   Neurological: She is alert. She is disoriented.   Skin: Skin is warm and dry.   Small bruises to bilateral lower extremities.  +  Left hip bruising   Psychiatric: Her behavior is normal. Thought content normal. Her mood appears anxious. Cognition and memory are impaired. She expresses impulsivity.   Nursing note and vitals reviewed.      Significant Labs:   BMP:   Recent Labs   Lab  09/22/18   0512   GLU  129*   NA  136   K  3.5   CL  107   CO2  19*   BUN  24   CREATININE  0.7   CALCIUM  8.5*   MG  1.8     CBC:   Recent Labs   Lab  09/21/18   0733  09/22/18   0512   WBC  14.24*  12.39   HGB  10.5*  10.1*   HCT  31.7*  30.3*   PLT  233  221     All pertinent labs within the past 24 hours have been reviewed.    Significant Imaging:   Imaging Results          CT Hip Without Contrast Left (Final result)  Result time 09/20/18 09:22:12    Final result by Liang Sunshine MD (09/20/18 09:22:12)                 Impression:      Acute subcapital left femoral neck fracture with displacement.    Underlying osteopenia.    Possible chronic S1 sacral fracture.    Sacroiliitis.  Lumbar degenerative disc disease and facet arthritis.    Incidental demonstration of sigmoid diverticulosis.      Electronically signed by: Liang Sunshine MD  Date:    09/20/2018  Time:    09:22             Narrative:    EXAMINATION:  CT HIP WITHOUT CONTRAST LEFT    CLINICAL HISTORY:  Trauma with left femoral neck fracture.  >    TECHNIQUE:  CT of the pelvis without contrast with coronal and sagittal reformats.  All CT scans at this facility are performed  using dose modulation techniques as appropriate to performed exam including the following:  automated exposure control; adjustment of mA and/or kV according to the patients size (this includes techniques or standardized protocols for targeted exams where dose is matched to indication/reason for exam: i.e. extremities or head);  iterative reconstruction technique.    COMPARISON:  None    FINDINGS:  Aortic atherosclerosis is noted.  Lumbar degenerative disc disease and facet arthritis.  Mild bilateral  sacroiliitis.    Generalized demineralization suggests osteopenia.  Sagittal reformat reveals a questionable chronic S1/coccyx fracture.    A left subcapital femoral neck fracture is present with superior displacement of the intertrochanteric femur with respect to the femoral head.    The acetabulum is intact.  The pubic rami are intact.    The right femoral neck is normal.                               CT Head Without Contrast (Final result)  Result time 09/20/18 09:19:13    Final result by Liang Sunshine MD (09/20/18 09:19:13)                 Impression:      Moderately advanced age-related atrophy.  No acute findings.      Electronically signed by: Liang Sunshine MD  Date:    09/20/2018  Time:    09:19             Narrative:    EXAMINATION:  CT HEAD WITHOUT CONTRAST    CLINICAL HISTORY:  Head injury with headache after a fall.    TECHNIQUE:  Standard noncontrast CT of the brain. All CT scans at this facility use dose modulation, iterative reconstruction and/or weight based dosing when appropriate to reduce radiation dose to as low as reasonably achievable.    COMPARISON:  None    FINDINGS:  The ventricles are moderately enlarged consistent with volume loss.  No acute edema, hemorrhage or mass effect is present.    Intracranial vascular calcification is noted.    No definite intracranial hemorrhage or subdural hematoma.    The skull appears grossly negative with no evidence of acute fracture.                               X-Ray Chest 1 View (Final result)  Result time 09/20/18 09:08:03   Procedure changed from X-Ray Chest PA And Lateral     Final result by Liang Sunshine MD (09/20/18 09:08:03)                 Impression:      No definite acute finding.      Electronically signed by: Liang Sunshine MD  Date:    09/20/2018  Time:    09:08             Narrative:    EXAMINATION:  XR CHEST 1 VIEW    CLINICAL HISTORY:  Chest trauma with chest pain due to a fall.,  Fall;    COMPARISON:  None    FINDINGS:  Cardiac size is normal.  Aorta is mildly enlarged and calcified as well as elongated.    The lung fields appear grossly clear.    The patient is rotated to the right.  Mild thoracic spondylosis.                               X-Ray Cervical Spine AP And Lateral (Final result)  Result time 09/20/18 09:13:12    Final result by KATHIE German Sr., MD (09/20/18 09:13:12)                 Impression:      1. There is grade 1 anterolisthesis of C3 on C4.  2. There are mild degenerative changes between C4 and C7.  .      Electronically signed by: Casey German MD  Date:    09/20/2018  Time:    09:13             Narrative:    EXAMINATION:  XR CERVICAL SPINE AP LATERAL    CLINICAL HISTORY:  Unspecified fall, initial encounter    COMPARISON:  A plain film examination of the thoracic spine performed on 09/20/2018.    FINDINGS:  There is grade 1 anterolisthesis of C3 on C4.  There is no fracture or scoliosis.  There is normal cervical lordosis.  There are mild degenerative changes between C4 and C7.  The prevertebral soft tissue space is normal in appearance.                               X-Ray Thoracic Spine AP Lateral (Final result)  Result time 09/20/18 09:18:20    Final result by KATHIE German Sr., MD (09/20/18 09:18:20)                 Impression:      1. The superior aspect of the thoracic spine is slightly tilted towards the right.  This may be positional.  2. There are mild degenerative changes in the thoracic spine.  3. There is an old-appearing mild anterior wedge defect/fracture of the T12 vertebral body.      Electronically signed by: Casey German MD  Date:    09/20/2018  Time:    09:18             Narrative:    EXAMINATION:  XR THORACIC SPINE AP LATERAL    COMPARISON:  A plain film examination of the cervical spine performed on 09/20/2018.  Comparison was also made to a plain film examination of the lumbar spine performed on 09/20/2018.    FINDINGS:  The superior  aspect of the thoracic spine is slightly tilted towards the right.  There is an old-appearing mild anterior wedge defect/fracture of the T12 vertebral body.  There is no spondylolisthesis in the thoracic spine.  There is normal thoracic kyphosis.  There are mild degenerative changes in the thoracic spine.                               X-Ray Lumbar Spine Ap And Lateral (Final result)  Result time 09/20/18 09:10:10    Final result by KATHIE German Sr., MD (09/20/18 09:10:10)                 Impression:      1. There are mild degenerative changes between T12 and L4 vertebral bodies.  2. There is an old-appearing mild anterior wedge defect/fracture of the T12 vertebral body.  3. There is a moderate amount of atherosclerosis.  4. There is a prominent amount of fecal material within the colon.      Electronically signed by: Casey German MD  Date:    09/20/2018  Time:    09:10             Narrative:    EXAMINATION:  XR LUMBAR SPINE AP AND LATERAL    CLINICAL HISTORY:  T/L-spine trauma, minor-mod, low back pain;    COMPARISON:  None    FINDINGS:  There are 5 lumbar type vertebral bodies. There is no fracture, spondylolisthesis, or scoliosis of the lumbar spine.  There is normal lumbar lordosis.  There are mild degenerative changes between T12 and L4 vertebral bodies.  There is an old-appearing mild anterior wedge defect/fracture of the T12 vertebral body.  There is a moderate amount of atherosclerosis.  There is a prominent amount of fecal material within the colon.                               X-Ray Knee 1 or 2 View Left (Final result)  Result time 09/20/18 09:07:09   Procedure changed from X-Ray Knee Complete 4 or More Views Left     Final result by KATHIE German Sr., MD (09/20/18 09:07:09)                 Impression:      1. No fracture or dislocation  2. There is a moderate amount of atherosclerosis.      Electronically signed by: Casey German MD  Date:    09/20/2018  Time:    09:07             Narrative:     EXAMINATION:  XR KNEE 1 OR 2 VIEW LEFT    CLINICAL HISTORY:  Pain in unspecified kneefall;    COMPARISON:  None    FINDINGS:  There is no fracture. There is no dislocation.  There is a moderate amount of atherosclerosis.                               X-Ray Hips Bilateral 2 View Incl AP Pelvis (Final result)  Result time 09/20/18 09:05:48    Final result by KATHIE German Sr., MD (09/20/18 09:05:48)                 Impression:      1. There is an acute appearing fracture through the left femoral neck.  2. There is a prominent amount of fecal material within the colon.  3. There is a mild amount of atherosclerosis.      Electronically signed by: Casey German MD  Date:    09/20/2018  Time:    09:05             Narrative:    EXAMINATION:  XR HIPS BILATERAL 2 VIEW INCL AP PELVIS    CLINICAL HISTORY:  Pain in unspecified hip    COMPARISON:  None    FINDINGS:  There is an acute appearing fracture through the left femoral neck.  The left femoral head still articulates with the left acetabulum.  There is a mild amount of atherosclerosis.  There is a prominent amount of fecal material within the colon.

## 2018-09-22 NOTE — PROGRESS NOTES
[facility] Hospital Medicine  Progress Note    Patient Name: Rosalina Thorpe  MRN: [id number]  Patient Class: IP- Inpatient   Admission Date: 9/20/2018  Length of Stay: 2 days  Attending Physician: Tre Monreal, *  Primary Care Provider: Richar Block MD        Subjective:     Principal Problem:S/P hip hemiarthroplasty    HPI:  Rosalina Thorpe is a 89 year old female with hypertension and dementia who presented to the ED with complaints of left hip, left knee and back pain. Reportedly, the patient was found in her bed at the [facility] where she resides complaining of pain. There was not a witnessed fall and the patient's bed alarm did not activate. Due to dementia, the patient is unable to provide any history. Evaluation of the patient's pain in the ED included CT head, x-rays of her left knee, bilateral hips and spine. Due to findings of an acute fracture of the left femoral neck, a CT scan of the hip was performed and confirmed a left subcapital femoral neck fracture with superior displacement of the intertrochanteric femur with respect to the femoral head. Other imaging studies were negative for acute findings. Labs and EKG in the ED were unremarkable with the exception of an 18,390 WBC count. The patient's son, Tim Thorpe, is her medical power of  and the patient is a DNR.     Hospital Course:  Patient admitted for closed fracture of left hip. CT scan of the hip was performed and confirmed a left subcapital femoral neck fracture with superior displacement of the intertrochanteric femur with respect to the femoral head. Orthopedics consulted. Left hip hemiarthroplasty planned for tomorrow. Patient s/p left hip hemiarthroplasty for femoral neck fracture on 9/22/18 per Dr. Bueno. Patient tolerated procedure well. VSS. Pain controlled.                 Interval History: Pt. S/p left hip hemiarthroplasty for femoral neck fracture. Patient tolerated procedure well.  Will monitor closely.         Review of Systems   Unable to perform ROS: Dementia     Objective:     Vital Signs (Most Recent):  Temp: 98.4 °F (36.9 °C) (09/22/18 0318)  Pulse: 63 (09/22/18 0318)  Resp: (!) 22 (09/22/18 0318)  BP: (!) 150/65 (09/22/18 0318)  SpO2: (!) 91 % (09/22/18 0318) Vital Signs (24h Range):  Temp:  [98.4 °F (36.9 °C)-99.7 °F (37.6 °C)] 98.4 °F (36.9 °C)  Pulse:  [63-84] 63  Resp:  [18-22] 22  SpO2:  [91 %-100 %] 91 %  BP: (130-155)/(60-67) 150/65     Weight: 47 kg (103 lb 9.9 oz)  Body mass index is 18.35 kg/m².    Intake/Output Summary (Last 24 hours) at 9/22/2018 0917  Last data filed at 9/22/2018 0656  Gross per 24 hour   Intake 1958.75 ml   Output 650 ml   Net 1308.75 ml      Physical Exam   Constitutional: She appears well-developed and well-nourished. No distress.   HENT:   Head: Normocephalic and atraumatic.   Eyes: Conjunctivae are normal.   PERRL   Neck: Neck supple. No JVD present.   Cardiovascular: Normal rate, regular rhythm and normal heart sounds.   Pulses:       Dorsalis pedis pulses are 2+ on the right side, and 2+ on the left side.        Posterior tibial pulses are 2+ on the right side, and 2+ on the left side.   Pulmonary/Chest: Effort normal and breath sounds normal.   Abdominal: Soft. Bowel sounds are normal. She exhibits no distension. There is no tenderness.   Musculoskeletal:        Left hip: She exhibits decreased range of motion and tenderness.        Legs:  Left Hip -dressing intact, 2 plus dorsalis pedis and posterior tibial artery pulses, light touch intact Left lower extremity.  All digits are warm. No erythema, no warmth, no drainage, no swelling, no significant tenderness.  Less than 2 seconds capillary refill all digits.   Neurological: She is alert. She is disoriented.   Skin: Skin is warm and dry.   Small bruises to bilateral lower extremities.  + Left hip bruising   Psychiatric: Her behavior is normal. Thought content normal. Her mood appears anxious.  Cognition and memory are impaired. She expresses impulsivity.   Nursing note and vitals reviewed.      Significant Labs:   BMP:   Recent Labs   Lab  09/22/18   0512   GLU  129*   NA  136   K  3.5   CL  107   CO2  19*   BUN  24   CREATININE  0.7   CALCIUM  8.5*   MG  1.8     CBC:   Recent Labs   Lab  09/21/18   0733  09/22/18   0512   WBC  14.24*  12.39   HGB  10.5*  10.1*   HCT  31.7*  30.3*   PLT  233  221     All pertinent labs within the past 24 hours have been reviewed.    Significant Imaging:   Imaging Results          CT Hip Without Contrast Left (Final result)  Result time 09/20/18 09:22:12    Final result by Liang Sunshine MD (09/20/18 09:22:12)                 Impression:      Acute subcapital left femoral neck fracture with displacement.    Underlying osteopenia.    Possible chronic S1 sacral fracture.    Sacroiliitis.  Lumbar degenerative disc disease and facet arthritis.    Incidental demonstration of sigmoid diverticulosis.      Electronically signed by: Liang Sunshine MD  Date:    09/20/2018  Time:    09:22             Narrative:    EXAMINATION:  CT HIP WITHOUT CONTRAST LEFT    CLINICAL HISTORY:  Trauma with left femoral neck fracture.  >    TECHNIQUE:  CT of the pelvis without contrast with coronal and sagittal reformats.  All CT scans at this facility are performed  using dose modulation techniques as appropriate to performed exam including the following:  automated exposure control; adjustment of mA and/or kV according to the patients size (this includes techniques or standardized protocols for targeted exams where dose is matched to indication/reason for exam: i.e. extremities or head);  iterative reconstruction technique.    COMPARISON:  None    FINDINGS:  Aortic atherosclerosis is noted.  Lumbar degenerative disc disease and facet arthritis.  Mild bilateral sacroiliitis.    Generalized demineralization suggests osteopenia.  Sagittal reformat reveals a questionable chronic S1/coccyx  fracture.    A left subcapital femoral neck fracture is present with superior displacement of the intertrochanteric femur with respect to the femoral head.    The acetabulum is intact.  The pubic rami are intact.    The right femoral neck is normal.                               CT Head Without Contrast (Final result)  Result time 09/20/18 09:19:13    Final result by Liang Sunshine MD (09/20/18 09:19:13)                 Impression:      Moderately advanced age-related atrophy.  No acute findings.      Electronically signed by: Liang Sunshine MD  Date:    09/20/2018  Time:    09:19             Narrative:    EXAMINATION:  CT HEAD WITHOUT CONTRAST    CLINICAL HISTORY:  Head injury with headache after a fall.    TECHNIQUE:  Standard noncontrast CT of the brain. All CT scans at this facility use dose modulation, iterative reconstruction and/or weight based dosing when appropriate to reduce radiation dose to as low as reasonably achievable.    COMPARISON:  None    FINDINGS:  The ventricles are moderately enlarged consistent with volume loss.  No acute edema, hemorrhage or mass effect is present.    Intracranial vascular calcification is noted.    No definite intracranial hemorrhage or subdural hematoma.    The skull appears grossly negative with no evidence of acute fracture.                               X-Ray Chest 1 View (Final result)  Result time 09/20/18 09:08:03   Procedure changed from X-Ray Chest PA And Lateral     Final result by Liang Sunshine MD (09/20/18 09:08:03)                 Impression:      No definite acute finding.      Electronically signed by: Liang Sunshine MD  Date:    09/20/2018  Time:    09:08             Narrative:    EXAMINATION:  XR CHEST 1 VIEW    CLINICAL HISTORY:  Chest trauma with chest pain due to a fall., Fall;    COMPARISON:  None    FINDINGS:  Cardiac size is normal.  Aorta is mildly enlarged and calcified as well as elongated.    The lung fields appear grossly  clear.    The patient is rotated to the right.  Mild thoracic spondylosis.                               X-Ray Cervical Spine AP And Lateral (Final result)  Result time 09/20/18 09:13:12    Final result by KATHIE German Sr., MD (09/20/18 09:13:12)                 Impression:      1. There is grade 1 anterolisthesis of C3 on C4.  2. There are mild degenerative changes between C4 and C7.  .      Electronically signed by: Casey German MD  Date:    09/20/2018  Time:    09:13             Narrative:    EXAMINATION:  XR CERVICAL SPINE AP LATERAL    CLINICAL HISTORY:  Unspecified fall, initial encounter    COMPARISON:  A plain film examination of the thoracic spine performed on 09/20/2018.    FINDINGS:  There is grade 1 anterolisthesis of C3 on C4.  There is no fracture or scoliosis.  There is normal cervical lordosis.  There are mild degenerative changes between C4 and C7.  The prevertebral soft tissue space is normal in appearance.                               X-Ray Thoracic Spine AP Lateral (Final result)  Result time 09/20/18 09:18:20    Final result by KATHIE German Sr., MD (09/20/18 09:18:20)                 Impression:      1. The superior aspect of the thoracic spine is slightly tilted towards the right.  This may be positional.  2. There are mild degenerative changes in the thoracic spine.  3. There is an old-appearing mild anterior wedge defect/fracture of the T12 vertebral body.      Electronically signed by: Casey German MD  Date:    09/20/2018  Time:    09:18             Narrative:    EXAMINATION:  XR THORACIC SPINE AP LATERAL    COMPARISON:  A plain film examination of the cervical spine performed on 09/20/2018.  Comparison was also made to a plain film examination of the lumbar spine performed on 09/20/2018.    FINDINGS:  The superior aspect of the thoracic spine is slightly tilted towards the right.  There is an old-appearing mild anterior wedge defect/fracture of the T12 vertebral body.  There  is no spondylolisthesis in the thoracic spine.  There is normal thoracic kyphosis.  There are mild degenerative changes in the thoracic spine.                               X-Ray Lumbar Spine Ap And Lateral (Final result)  Result time 09/20/18 09:10:10    Final result by KATHIE German Sr., MD (09/20/18 09:10:10)                 Impression:      1. There are mild degenerative changes between T12 and L4 vertebral bodies.  2. There is an old-appearing mild anterior wedge defect/fracture of the T12 vertebral body.  3. There is a moderate amount of atherosclerosis.  4. There is a prominent amount of fecal material within the colon.      Electronically signed by: Casey German MD  Date:    09/20/2018  Time:    09:10             Narrative:    EXAMINATION:  XR LUMBAR SPINE AP AND LATERAL    CLINICAL HISTORY:  T/L-spine trauma, minor-mod, low back pain;    COMPARISON:  None    FINDINGS:  There are 5 lumbar type vertebral bodies. There is no fracture, spondylolisthesis, or scoliosis of the lumbar spine.  There is normal lumbar lordosis.  There are mild degenerative changes between T12 and L4 vertebral bodies.  There is an old-appearing mild anterior wedge defect/fracture of the T12 vertebral body.  There is a moderate amount of atherosclerosis.  There is a prominent amount of fecal material within the colon.                               X-Ray Knee 1 or 2 View Left (Final result)  Result time 09/20/18 09:07:09   Procedure changed from X-Ray Knee Complete 4 or More Views Left     Final result by KATHIE German Sr., MD (09/20/18 09:07:09)                 Impression:      1. No fracture or dislocation  2. There is a moderate amount of atherosclerosis.      Electronically signed by: Casey German MD  Date:    09/20/2018  Time:    09:07             Narrative:    EXAMINATION:  XR KNEE 1 OR 2 VIEW LEFT    CLINICAL HISTORY:  Pain in unspecified kneefall;    COMPARISON:  None    FINDINGS:  There is no fracture. There is no  dislocation.  There is a moderate amount of atherosclerosis.                               X-Ray Hips Bilateral 2 View Incl AP Pelvis (Final result)  Result time 09/20/18 09:05:48    Final result by KATHIE German Sr., MD (09/20/18 09:05:48)                 Impression:      1. There is an acute appearing fracture through the left femoral neck.  2. There is a prominent amount of fecal material within the colon.  3. There is a mild amount of atherosclerosis.      Electronically signed by: Casey German MD  Date:    09/20/2018  Time:    09:05             Narrative:    EXAMINATION:  XR HIPS BILATERAL 2 VIEW INCL AP PELVIS    CLINICAL HISTORY:  Pain in unspecified hip    COMPARISON:  None    FINDINGS:  There is an acute appearing fracture through the left femoral neck.  The left femoral head still articulates with the left acetabulum.  There is a mild amount of atherosclerosis.  There is a prominent amount of fecal material within the colon.                              Assessment/Plan:      * S/P hip hemiarthroplasty    -Dr. Peralta with Orthopedics has been consulted.   -Left hemiarthroplasty plan for tomorrow   -S/p Left hip hemiarthroplasty for femoral neck fracture  -PT/OT consult  - Monitor hemoglobin  -social service consult for discharge planning   -analgesia prn   -ASA for DVT prophylaxis               Leukocytosis    -Possibly reactive.   -Monitor  -Trending down   -Resolved             Hypertensive urgency    -Resume home medications.   -IV Hydralazine as needed.   -Bp better controlled   -Resolved         Major depressive disorder    Continue Celexa.           Chronic UTI    -Continue Keflex for prophylaxis.   -Monitor for signs and symptoms of infection.         Dementia without behavioral disturbance    Resume Risperdal    Monitor           VTE Risk Mitigation (From admission, onward)        Ordered     IP VTE HIGH RISK PATIENT  Once      09/22/18 1113     Place EVELYN hose  Until discontinued       09/22/18 1113     Place sequential compression device  Until discontinued      09/22/18 1113     Place sequential compression device  Until discontinued      09/20/18 1508     Place EVELYN hose  Until discontinued      09/20/18 1246              Rena Pinzon NP  Department of Hospital Medicine   [facility]

## 2018-09-22 NOTE — PLAN OF CARE
Pt remains free from injury and falls. Fall precautions in place. Bed alarm intact and functioning when pt in bed. Pt turned Q2 hours. Pt is very drowsy from surgery. Dressing to left hip clean, dry, and intact. Non verbal indicators of pain absent. Pt disoriented to place, time and situation when aroused by voice. Oral meds held since pt is very drowsy and not following commands to swallow. Pt not able to verbalize wants and needs at this time. Bed in low, locked position, call light and personal items within reach of pt. POC discussed with family. Verbalized understanding. VSS. Will continue to monitor.

## 2018-09-22 NOTE — PLAN OF CARE
Problem: Patient Care Overview  Goal: Plan of Care Review  Outcome: Ongoing (interventions implemented as appropriate)  Pt remains free from injury and falls. Fall precautions in place. Bed alarm intact and functioning when pt in bed. Pt turned Q2 hours. Pt is very drowsy from surgery. Dressing to left hip clean, dry, and intact. Non verbal indicators of pain absent. Pt disoriented to place, time and situation. Oral meds not given as pt is too sleepy. Will re-assess in a little while. Pt not able to verbalize wants and needs. Bed in low, locked position, call light and personal items within reach of pt. POC discussed with family. Verbalized understanding. VSS. Will continue to monitor.

## 2018-09-22 NOTE — TRANSFER OF CARE
"Anesthesia Transfer of Care Note    Patient: Rosalina Thorpe    Procedure(s) Performed: Procedure(s) (LRB):  ARTHROPLASTY, HIP (Left)    Patient location: PACU    Anesthesia Type: general    Transport from OR: Transported from OR on room air with adequate spontaneous ventilation    Post pain: adequate analgesia    Post assessment: no apparent anesthetic complications and tolerated procedure well    Post vital signs: stable    Level of consciousness: awake    Nausea/Vomiting: no nausea/vomiting    Complications: none    Transfer of care protocol was followed      Last vitals:   Visit Vitals  BP (!) 150/65 (BP Location: Left arm, Patient Position: Lying)   Pulse 63   Temp 36.9 °C (98.4 °F) (Axillary)   Resp (!) 22   Ht 5' 3" (1.6 m)   Wt 47 kg (103 lb 9.9 oz)   LMP  (LMP Unknown)   SpO2 (!) 91%   Breastfeeding? No   BMI 18.35 kg/m²     "

## 2018-09-23 PROBLEM — D72.829 LEUKOCYTOSIS: Status: RESOLVED | Noted: 2018-01-01 | Resolved: 2018-01-01

## 2018-09-23 NOTE — PT/OT/SLP EVAL
Physical Therapy Evaluation    Patient Name:  Rosalina Thorpe   MRN:  [id number]    Recommendations:     Discharge Recommendations:  nursing facility, skilled   Discharge Equipment Recommendations: walker, rolling, wheelchair, bath bench   Barriers to discharge: None    Assessment:     Rosalina Thorpe is a 89 y.o. female admitted with a medical diagnosis of S/P hip hemiarthroplasty.  She presents with the following impairments/functional limitations:  weakness, impaired endurance, impaired self care skills, impaired functional mobilty, gait instability, impaired balance, impaired cognition, decreased coordination, decreased lower extremity function, decreased safety awareness, pain.    Rehab Prognosis:  FAIR; patient would benefit from acute skilled PT services to address these deficits and reach maximum level of function.      Recent Surgery: Procedure(s) (LRB):  ARTHROPLASTY, HIP (Left) 1 Day Post-Op    Plan:     During this hospitalization, patient to be seen 5 x/week to address the above listed problems via gait training, therapeutic activities, therapeutic exercises  · Plan of Care Expires:  09/30/18   Plan of Care Reviewed with: patient, son    Subjective     Communicated with [facility] and nurse Webb prior to session.  Patient found supine in bed upon PT entry to room, agreeable to evaluation.      Chief Complaint: Hip pain  Patient comments/goals: None voiced; patient pleasantly confused (dementia)  Pain/Comfort:  · Pain Rating 1: 5/10  · Location - Side 1: Left  · Location 1: hip  · Pain Addressed 1: Pre-medicate for activity, Reposition, Distraction, Cessation of Activity    Patients cultural, spiritual, Jainism conflicts given the current situation: no    Living Environment:  Patient lives in dementia/lock-down unit of assisted living facility.  Prior to admission, patients level of function was assistance with ADLs and indep with amb, though her son reports that she has had several falls.  Patient has the  following equipment: none.  DME owned (not currently used): none.  Upon discharge, patient will have assistance from caregivers at assisted living facility and her son.    Objective:     Patient found with: peripheral IV, telemetry     General Precautions: Standard, fall   Orthopedic Precautions:LLE weight bearing as tolerated   Braces: N/A     Exams:  · Cognitive Exam:  Patient is oriented to Person  · Gross Motor Coordination:  Impaired  · Postural Exam:  Patient presented with the following abnormalities:    · -       Rounded shoulders  · -       Forward head  · -       Posterior pelvic tilt  · RLE ROM: WFL  · RLE Strength: WFL  · LLE ROM: AROM decreased s/p hip sx  · LLE Strength: Functionally 3/5    Functional Mobility:  · Bed Mobility:     · Rolling Right: maximal assistance  · Scooting: maximal assistance  · Supine to Sit: maximal assistance and of 2 persons  · Transfers:     · Sit to Stand:  maximal assistance and of 2 persons with rolling walker  · Bed to Chair: maximal assistance and of 2 persons with  hand-held assist  using  Stand Pivot  · Gait: Patient with significant posterior pushing and lifting RW off ground; unable to progress to gait today.  · Balance: Fair - sitting balance; Poor - standing balance    AM-PAC 6 CLICK MOBILITY  Total Score:11       Therapeutic Activities and Exercises:   Patient participated in bed mobility and transfer training.  Static sitting balance at EOB fluctuated from SBA to min assist.  Patient with dementia and has never used RW before.  She had difficulty understanding use of RW; therefore, HHA x 2 was used for stand-pivot transfer to chair.      Patient left up in chair with all lines intact, call button in reach, nurse Tracey notified, son present and chair alarm on.  Son to notify nurse prior to leaving so that patient can be assisted back to bed.    GOALS:   Multidisciplinary Problems     Physical Therapy Goals        Problem: Physical Therapy Goal    Goal Priority  Disciplines Outcome Goal Variances Interventions   Physical Therapy Goal     PT, PT/OT      Description:  LTGs to be met within 7 days (9/30/18):  1.  Patient will perform bed mobility with mod assist  2.  Patient will perform functional transfers with mod assist and RW  3.  Patient will ambulate 25 feet with RW with mod assist and no gross LOB  4.  Patient will perform BLE therapeutic exercise 10 x 2 in all planes with cues/assist as needed                    History:     Past Medical History:   Diagnosis Date    Alzheimer disease     Chronic UTI     Essential (primary) hypertension     Glaucoma     Macular degeneration     Major depressive disorder     Renal disorder     Urinary tract infection        Past Surgical History:   Procedure Laterality Date    APPENDECTOMY      EYE SURGERY      cataract surger ou    HYSTERECTOMY         Clinical Decision Making:     History  Co-morbidities and personal factors that may impact the plan of care Examination  Body Structures and Functions, activity limitations and participation restrictions that may impact the plan of care Clinical Presentation   Decision Making/ Complexity Score   Co-morbidities:   [] Time since onset of injury / illness / exacerbation  [] Status of current condition  []Patient's cognitive status and safety concerns    [] Multiple Medical Problems (see med hx)  Personal Factors:   [] Patient's age  [] Prior Level of function   [] Patient's home situation (environment and family support)  [] Patient's level of motivation  [] Expected progression of patient      HISTORY:(criteria)    [] 78224 - no personal factors/history    [] 74838 - has 1-2 personal factor/comorbidity     [] 99188 - has >3 personal factor/comorbidity     Body Regions:  [] Objective examination findings  [] Head     []  Neck  [] Trunk   [] Upper Extremity  [] Lower Extremity    Body Systems:  [] For communication ability, affect, cognition, language, and learning style: the  assessment of the ability to make needs known, consciousness, orientation (person, place, and time), expected emotional /behavioral responses, and learning preferences (eg, learning barriers, education  needs)  [] For the neuromuscular system: a general assessment of gross coordinated movement (eg, balance, gait, locomotion, transfers, and transitions) and motor function  (motor control and motor learning)  [] For the musculoskeletal system: the assessment of gross symmetry, gross range of motion, gross strength, height, and weight  [] For the integumentary system: the assessment of pliability(texture), presence of scar formation, skin color, and skin integrity  [] For cardiovascular/pulmonary system: the assessment of heart rate, respiratory rate, blood pressure, and edema     Activity limitations:    [] Patient's cognitive status and saf ety concerns          [] Status of current condition      [] Weight bearing restriction  [] Cardiopulmunary Restriction    Participation Restrictions:   [] Goals and goal agreement with the patient     [] Rehab potential (prognosis) and probable outcome      Examination of Body System: (criteria)    [] 45622 - addressing 1-2 elements    [] 38317 - addressing a total of 3 or more elements     [] 91339 -  Addressing a total of 4 or more elements         Clinical Presentation: (criteria)  Choose one     On examination of body system using standardized tests and measures patient presents with (CHOOSE ONE) elements from any of the following: body structures and functions, activity limitations, and/or participation restrictions.  Leading to a clinical presentation that is considered (CHOOSE ONE)                              Clinical Decision Making  (Eval Complexity):  Choose One     Time Tracking:     PT Received On: 09/23/18  PT Start Time: 1023     PT Stop Time: 1046  PT Total Time (min): 23 min     Billable Minutes: Evaluation 13 min and Therapeutic Activity 10 min      Kaci  Agnes, PT, DPT  09/23/2018

## 2018-09-23 NOTE — PLAN OF CARE
Pt remains free from injury and falls. Fall precautions in place. Bed/chair alarm intact and functioning when pt in bed or chair. Pt turned Q2 hours. Pt is more alert today but remains disoriented to place, time, and situation. Dressing to left hip clean, dry, and intact. PRN pain med given for moaning. Pt did much better with lunch and ate some pudding by being fed. Swallowing eval ordered. Due to void. Bladder scanned for 149 ml urine. No orders received. Pt not able to verbalize wants and needs at this time. Bed in low, locked position, call light and personal items within reach of pt. POC discussed with son. Verbalized understanding. VSS. Will continue to monitor.

## 2018-09-23 NOTE — NURSING
Pt has not voided since cather coming out this morning. Pt bladder scanned for 187 ml. FELISHA Pinzon NP made aware. IV fluids increased per order. Will bladder scan again in 4 hours.

## 2018-09-23 NOTE — PROGRESS NOTES
"[facility]  Orthopedics  Progress Note    Patient Name: Rosalina Thorpe  MRN: [id number]  Admission Date: 9/20/2018  Hospital Length of Stay: 3 days  Attending Provider: Tre Monreal, *  Primary Care Provider: Richar Block MD  Follow-up For: Procedure(s) (LRB):  ARTHROPLASTY, HIP (Left)    Post-Operative Day: 1 Day Post-Op  Subjective:     Principal Problem:S/P hip hemiarthroplasty    Principal Orthopedic Problem: left femoral neck fx    Interval History: left hip gaby 9/22    Review of patient's allergies indicates:  No Known Allergies    Current Facility-Administered Medications   Medication    0.9%  NaCl infusion    acetaminophen tablet 650 mg    aspirin tablet 325 mg    cephALEXin 250 mg/5 mL suspension 250 mg    cetirizine tablet 10 mg    chlorhexidine 0.12 % solution 10 mL    citalopram tablet 10 mg    diltiaZEM 24 hr capsule 240 mg    hydrALAZINE injection 10 mg    HYDROcodone-acetaminophen 5-325 mg per tablet 1 tablet    latanoprost 0.005 % ophthalmic solution 1 drop    losartan tablet 50 mg    metoprolol tartrate (LOPRESSOR) tablet 25 mg    morphine injection 2 mg    nozaseptin (NOZIN) nasal     ondansetron disintegrating tablet 8 mg    potassium, sodium phosphates 280-160-250 mg packet 1 packet    risperiDONE tablet 0.25 mg    sodium chloride 0.9% flush 3 mL     Objective:     Vital Signs (Most Recent):  Temp: 97.8 °F (36.6 °C) (09/23/18 1135)  Pulse: 72 (09/23/18 1135)  Resp: 19 (09/23/18 1135)  BP: (!) 128/58 (09/23/18 1135)  SpO2: 96 % (09/23/18 1135) Vital Signs (24h Range):  Temp:  [97.8 °F (36.6 °C)-100 °F (37.8 °C)] 97.8 °F (36.6 °C)  Pulse:  [72-90] 72  Resp:  [18-20] 19  SpO2:  [90 %-96 %] 96 %  BP: (128-190)/(58-74) 128/58     Weight: 47 kg (103 lb 9.9 oz)  Height: 5' 3" (160 cm)  Body mass index is 18.35 kg/m².      Intake/Output Summary (Last 24 hours) at 9/23/2018 1328  Last data filed at 9/23/2018 1309  Gross per 24 hour   Intake 662.5 ml "   Output 650 ml   Net 12.5 ml       Ortho/SPM Exam   NAD  Confused, but alert  LLE: dressings c/d/i. Moving foot/ankle well      Significant Labs: All pertinent labs within the past 24 hours have been reviewed.  None    Significant Imaging: None    Assessment/Plan:     * S/P hip hemiarthroplasty    WBAT  Abduction pillow x 48 hours (remove 9/24/18)  Dressing change POD4 - dry sterile gauze and medipore tape  Up with PT  periop abx only  ASA BID for DVT prophylaxis, EVELYN hose, SCDs  Follow-up 2 weeks with me              Hi Bueno MD  Orthopedics  [facility]

## 2018-09-23 NOTE — SUBJECTIVE & OBJECTIVE
Interval History: POD #1, H/H stable. VSS. K+3.3, will replete.     Review of Systems   Unable to perform ROS: Dementia     Objective:     Vital Signs (Most Recent):  Temp: 98.2 °F (36.8 °C) (09/23/18 0749)  Pulse: 86 (09/23/18 0820)  Resp: 20 (09/23/18 0820)  BP: (!) 149/74 (09/23/18 0749)  SpO2: (!) 94 % (09/23/18 0820) Vital Signs (24h Range):  Temp:  [97.5 °F (36.4 °C)-100 °F (37.8 °C)] 98.2 °F (36.8 °C)  Pulse:  [66-90] 86  Resp:  [15-20] 20  SpO2:  [90 %-100 %] 94 %  BP: (137-190)/(65-88) 149/74     Weight: 47 kg (103 lb 9.9 oz)  Body mass index is 18.35 kg/m².    Intake/Output Summary (Last 24 hours) at 9/23/2018 0959  Last data filed at 9/23/2018 0844  Gross per 24 hour   Intake 1942.5 ml   Output 950 ml   Net 992.5 ml      Physical Exam   Constitutional: She appears well-developed and well-nourished. No distress.   HENT:   Head: Normocephalic and atraumatic.   Eyes: Conjunctivae are normal.   PERRL   Neck: Neck supple. No JVD present.   Cardiovascular: Normal rate, regular rhythm and normal heart sounds.   Pulses:       Dorsalis pedis pulses are 2+ on the right side, and 2+ on the left side.        Posterior tibial pulses are 2+ on the right side, and 2+ on the left side.   Pulmonary/Chest: Effort normal and breath sounds normal.   Abdominal: Soft. Bowel sounds are normal. She exhibits no distension. There is no tenderness.   Musculoskeletal:        Left hip: She exhibits decreased range of motion and tenderness.        Legs:  Left Hip -dressing intact, 2 plus dorsalis pedis and posterior tibial artery pulses, light touch intact Left lower extremity.  All digits are warm. No erythema, no warmth, no drainage, no swelling, no significant tenderness.  Less than 2 seconds capillary refill all digits.   Neurological: She is alert. She is disoriented.   Skin: Skin is warm and dry.   Small bruises to bilateral lower extremities.  + Left hip bruising   Psychiatric: She has a normal mood and affect. Her speech is  normal and behavior is normal. Cognition and memory are impaired.   Confused    Nursing note and vitals reviewed.      Significant Labs:   BMP:   Recent Labs   Lab  09/23/18   0547   GLU  101   NA  136   K  3.3*   CL  105   CO2  20*   BUN  22   CREATININE  0.7   CALCIUM  8.1*   MG  1.8     CBC:   Recent Labs   Lab  09/22/18   0512  09/22/18   1151  09/23/18   0547   WBC  12.39  13.79*  12.61   HGB  10.1*  11.1*  9.6*   HCT  30.3*  33.4*  28.9*   PLT  221  197  214     All pertinent labs within the past 24 hours have been reviewed.    Significant Imaging:   Imaging Results          CT Hip Without Contrast Left (Final result)  Result time 09/20/18 09:22:12    Final result by Liang Sunshine MD (09/20/18 09:22:12)                 Impression:      Acute subcapital left femoral neck fracture with displacement.    Underlying osteopenia.    Possible chronic S1 sacral fracture.    Sacroiliitis.  Lumbar degenerative disc disease and facet arthritis.    Incidental demonstration of sigmoid diverticulosis.      Electronically signed by: Liang Sunshine MD  Date:    09/20/2018  Time:    09:22             Narrative:    EXAMINATION:  CT HIP WITHOUT CONTRAST LEFT    CLINICAL HISTORY:  Trauma with left femoral neck fracture.  >    TECHNIQUE:  CT of the pelvis without contrast with coronal and sagittal reformats.  All CT scans at this facility are performed  using dose modulation techniques as appropriate to performed exam including the following:  automated exposure control; adjustment of mA and/or kV according to the patients size (this includes techniques or standardized protocols for targeted exams where dose is matched to indication/reason for exam: i.e. extremities or head);  iterative reconstruction technique.    COMPARISON:  None    FINDINGS:  Aortic atherosclerosis is noted.  Lumbar degenerative disc disease and facet arthritis.  Mild bilateral sacroiliitis.    Generalized demineralization suggests osteopenia.   Sagittal reformat reveals a questionable chronic S1/coccyx fracture.    A left subcapital femoral neck fracture is present with superior displacement of the intertrochanteric femur with respect to the femoral head.    The acetabulum is intact.  The pubic rami are intact.    The right femoral neck is normal.                               CT Head Without Contrast (Final result)  Result time 09/20/18 09:19:13    Final result by Liang Sunshine MD (09/20/18 09:19:13)                 Impression:      Moderately advanced age-related atrophy.  No acute findings.      Electronically signed by: Liang Sunshine MD  Date:    09/20/2018  Time:    09:19             Narrative:    EXAMINATION:  CT HEAD WITHOUT CONTRAST    CLINICAL HISTORY:  Head injury with headache after a fall.    TECHNIQUE:  Standard noncontrast CT of the brain. All CT scans at this facility use dose modulation, iterative reconstruction and/or weight based dosing when appropriate to reduce radiation dose to as low as reasonably achievable.    COMPARISON:  None    FINDINGS:  The ventricles are moderately enlarged consistent with volume loss.  No acute edema, hemorrhage or mass effect is present.    Intracranial vascular calcification is noted.    No definite intracranial hemorrhage or subdural hematoma.    The skull appears grossly negative with no evidence of acute fracture.                               X-Ray Chest 1 View (Final result)  Result time 09/20/18 09:08:03   Procedure changed from X-Ray Chest PA And Lateral     Final result by Liang Sunshine MD (09/20/18 09:08:03)                 Impression:      No definite acute finding.      Electronically signed by: Liang Sunshine MD  Date:    09/20/2018  Time:    09:08             Narrative:    EXAMINATION:  XR CHEST 1 VIEW    CLINICAL HISTORY:  Chest trauma with chest pain due to a fall., Fall;    COMPARISON:  None    FINDINGS:  Cardiac size is normal.  Aorta is mildly enlarged and calcified as  well as elongated.    The lung fields appear grossly clear.    The patient is rotated to the right.  Mild thoracic spondylosis.                               X-Ray Cervical Spine AP And Lateral (Final result)  Result time 09/20/18 09:13:12    Final result by KATHIE German Sr., MD (09/20/18 09:13:12)                 Impression:      1. There is grade 1 anterolisthesis of C3 on C4.  2. There are mild degenerative changes between C4 and C7.  .      Electronically signed by: Casey German MD  Date:    09/20/2018  Time:    09:13             Narrative:    EXAMINATION:  XR CERVICAL SPINE AP LATERAL    CLINICAL HISTORY:  Unspecified fall, initial encounter    COMPARISON:  A plain film examination of the thoracic spine performed on 09/20/2018.    FINDINGS:  There is grade 1 anterolisthesis of C3 on C4.  There is no fracture or scoliosis.  There is normal cervical lordosis.  There are mild degenerative changes between C4 and C7.  The prevertebral soft tissue space is normal in appearance.                               X-Ray Thoracic Spine AP Lateral (Final result)  Result time 09/20/18 09:18:20    Final result by KATHIE German Sr., MD (09/20/18 09:18:20)                 Impression:      1. The superior aspect of the thoracic spine is slightly tilted towards the right.  This may be positional.  2. There are mild degenerative changes in the thoracic spine.  3. There is an old-appearing mild anterior wedge defect/fracture of the T12 vertebral body.      Electronically signed by: Casey German MD  Date:    09/20/2018  Time:    09:18             Narrative:    EXAMINATION:  XR THORACIC SPINE AP LATERAL    COMPARISON:  A plain film examination of the cervical spine performed on 09/20/2018.  Comparison was also made to a plain film examination of the lumbar spine performed on 09/20/2018.    FINDINGS:  The superior aspect of the thoracic spine is slightly tilted towards the right.  There is an old-appearing mild anterior  wedge defect/fracture of the T12 vertebral body.  There is no spondylolisthesis in the thoracic spine.  There is normal thoracic kyphosis.  There are mild degenerative changes in the thoracic spine.                               X-Ray Lumbar Spine Ap And Lateral (Final result)  Result time 09/20/18 09:10:10    Final result by KATHIE German Sr., MD (09/20/18 09:10:10)                 Impression:      1. There are mild degenerative changes between T12 and L4 vertebral bodies.  2. There is an old-appearing mild anterior wedge defect/fracture of the T12 vertebral body.  3. There is a moderate amount of atherosclerosis.  4. There is a prominent amount of fecal material within the colon.      Electronically signed by: Casey German MD  Date:    09/20/2018  Time:    09:10             Narrative:    EXAMINATION:  XR LUMBAR SPINE AP AND LATERAL    CLINICAL HISTORY:  T/L-spine trauma, minor-mod, low back pain;    COMPARISON:  None    FINDINGS:  There are 5 lumbar type vertebral bodies. There is no fracture, spondylolisthesis, or scoliosis of the lumbar spine.  There is normal lumbar lordosis.  There are mild degenerative changes between T12 and L4 vertebral bodies.  There is an old-appearing mild anterior wedge defect/fracture of the T12 vertebral body.  There is a moderate amount of atherosclerosis.  There is a prominent amount of fecal material within the colon.                               X-Ray Knee 1 or 2 View Left (Final result)  Result time 09/20/18 09:07:09   Procedure changed from X-Ray Knee Complete 4 or More Views Left     Final result by KATHIE German Sr., MD (09/20/18 09:07:09)                 Impression:      1. No fracture or dislocation  2. There is a moderate amount of atherosclerosis.      Electronically signed by: Casey German MD  Date:    09/20/2018  Time:    09:07             Narrative:    EXAMINATION:  XR KNEE 1 OR 2 VIEW LEFT    CLINICAL HISTORY:  Pain in unspecified  kneefall;    COMPARISON:  None    FINDINGS:  There is no fracture. There is no dislocation.  There is a moderate amount of atherosclerosis.                               X-Ray Hips Bilateral 2 View Incl AP Pelvis (Final result)  Result time 09/20/18 09:05:48    Final result by KATHIE German Sr., MD (09/20/18 09:05:48)                 Impression:      1. There is an acute appearing fracture through the left femoral neck.  2. There is a prominent amount of fecal material within the colon.  3. There is a mild amount of atherosclerosis.      Electronically signed by: Casey German MD  Date:    09/20/2018  Time:    09:05             Narrative:    EXAMINATION:  XR HIPS BILATERAL 2 VIEW INCL AP PELVIS    CLINICAL HISTORY:  Pain in unspecified hip    COMPARISON:  None    FINDINGS:  There is an acute appearing fracture through the left femoral neck.  The left femoral head still articulates with the left acetabulum.  There is a mild amount of atherosclerosis.  There is a prominent amount of fecal material within the colon.

## 2018-09-23 NOTE — PROGRESS NOTES
[facility] Hospital Medicine  Progress Note    Patient Name: Rosalina Thorpe  MRN: [id number]  Patient Class: IP- Inpatient   Admission Date: 9/20/2018  Length of Stay: 3 days  Attending Physician: Tre Monreal, *  Primary Care Provider: Richar Block MD        Subjective:     Principal Problem:S/P hip hemiarthroplasty    HPI:  Rosalina Thorpe is a 89 year old female with hypertension and dementia who presented to the ED with complaints of left hip, left knee and back pain. Reportedly, the patient was found in her bed at the [facility] where she resides complaining of pain. There was not a witnessed fall and the patient's bed alarm did not activate. Due to dementia, the patient is unable to provide any history. Evaluation of the patient's pain in the ED included CT head, x-rays of her left knee, bilateral hips and spine. Due to findings of an acute fracture of the left femoral neck, a CT scan of the hip was performed and confirmed a left subcapital femoral neck fracture with superior displacement of the intertrochanteric femur with respect to the femoral head. Other imaging studies were negative for acute findings. Labs and EKG in the ED were unremarkable with the exception of an 18,390 WBC count. The patient's son, Tim Thorpe, is her medical power of  and the patient is a DNR.     Hospital Course:  Patient admitted for closed fracture of left hip. CT scan of the hip was performed and confirmed a left subcapital femoral neck fracture with superior displacement of the intertrochanteric femur with respect to the femoral head. Orthopedics consulted. Left hip hemiarthroplasty planned for tomorrow. Patient s/p left hip hemiarthroplasty for femoral neck fracture on 9/22/18 per Dr. Bueno. Patient tolerated procedure well. VSS. Pain controlled. POD #1, H/H 9.6/28.9. K+3.3, will replete. Social work consult for discharge planning. PT/OT consult.                 Interval  History: POD #1, H/H stable. VSS. K+3.3, will replete.     Review of Systems   Unable to perform ROS: Dementia     Objective:     Vital Signs (Most Recent):  Temp: 98.2 °F (36.8 °C) (09/23/18 0749)  Pulse: 86 (09/23/18 0820)  Resp: 20 (09/23/18 0820)  BP: (!) 149/74 (09/23/18 0749)  SpO2: (!) 94 % (09/23/18 0820) Vital Signs (24h Range):  Temp:  [97.5 °F (36.4 °C)-100 °F (37.8 °C)] 98.2 °F (36.8 °C)  Pulse:  [66-90] 86  Resp:  [15-20] 20  SpO2:  [90 %-100 %] 94 %  BP: (137-190)/(65-88) 149/74     Weight: 47 kg (103 lb 9.9 oz)  Body mass index is 18.35 kg/m².    Intake/Output Summary (Last 24 hours) at 9/23/2018 0959  Last data filed at 9/23/2018 0844  Gross per 24 hour   Intake 1942.5 ml   Output 950 ml   Net 992.5 ml      Physical Exam   Constitutional: She appears well-developed and well-nourished. No distress.   HENT:   Head: Normocephalic and atraumatic.   Eyes: Conjunctivae are normal.   PERRL   Neck: Neck supple. No JVD present.   Cardiovascular: Normal rate, regular rhythm and normal heart sounds.   Pulses:       Dorsalis pedis pulses are 2+ on the right side, and 2+ on the left side.        Posterior tibial pulses are 2+ on the right side, and 2+ on the left side.   Pulmonary/Chest: Effort normal and breath sounds normal.   Abdominal: Soft. Bowel sounds are normal. She exhibits no distension. There is no tenderness.   Musculoskeletal:        Left hip: She exhibits decreased range of motion and tenderness.        Legs:  Left Hip -dressing intact, 2 plus dorsalis pedis and posterior tibial artery pulses, light touch intact Left lower extremity.  All digits are warm. No erythema, no warmth, no drainage, no swelling, no significant tenderness.  Less than 2 seconds capillary refill all digits.   Neurological: She is alert. She is disoriented.   Skin: Skin is warm and dry.   Small bruises to bilateral lower extremities.  + Left hip bruising   Psychiatric: She has a normal mood and affect. Her speech is normal and  behavior is normal. Cognition and memory are impaired.   Confused    Nursing note and vitals reviewed.      Significant Labs:   BMP:   Recent Labs   Lab  09/23/18   0547   GLU  101   NA  136   K  3.3*   CL  105   CO2  20*   BUN  22   CREATININE  0.7   CALCIUM  8.1*   MG  1.8     CBC:   Recent Labs   Lab  09/22/18   0512  09/22/18   1151  09/23/18   0547   WBC  12.39  13.79*  12.61   HGB  10.1*  11.1*  9.6*   HCT  30.3*  33.4*  28.9*   PLT  221  197  214     All pertinent labs within the past 24 hours have been reviewed.    Significant Imaging:   Imaging Results          CT Hip Without Contrast Left (Final result)  Result time 09/20/18 09:22:12    Final result by Liang Sunshine MD (09/20/18 09:22:12)                 Impression:      Acute subcapital left femoral neck fracture with displacement.    Underlying osteopenia.    Possible chronic S1 sacral fracture.    Sacroiliitis.  Lumbar degenerative disc disease and facet arthritis.    Incidental demonstration of sigmoid diverticulosis.      Electronically signed by: Liang Sunshine MD  Date:    09/20/2018  Time:    09:22             Narrative:    EXAMINATION:  CT HIP WITHOUT CONTRAST LEFT    CLINICAL HISTORY:  Trauma with left femoral neck fracture.  >    TECHNIQUE:  CT of the pelvis without contrast with coronal and sagittal reformats.  All CT scans at this facility are performed  using dose modulation techniques as appropriate to performed exam including the following:  automated exposure control; adjustment of mA and/or kV according to the patients size (this includes techniques or standardized protocols for targeted exams where dose is matched to indication/reason for exam: i.e. extremities or head);  iterative reconstruction technique.    COMPARISON:  None    FINDINGS:  Aortic atherosclerosis is noted.  Lumbar degenerative disc disease and facet arthritis.  Mild bilateral sacroiliitis.    Generalized demineralization suggests osteopenia.  Sagittal reformat  reveals a questionable chronic S1/coccyx fracture.    A left subcapital femoral neck fracture is present with superior displacement of the intertrochanteric femur with respect to the femoral head.    The acetabulum is intact.  The pubic rami are intact.    The right femoral neck is normal.                               CT Head Without Contrast (Final result)  Result time 09/20/18 09:19:13    Final result by Liang Sunshine MD (09/20/18 09:19:13)                 Impression:      Moderately advanced age-related atrophy.  No acute findings.      Electronically signed by: Liang Sunshine MD  Date:    09/20/2018  Time:    09:19             Narrative:    EXAMINATION:  CT HEAD WITHOUT CONTRAST    CLINICAL HISTORY:  Head injury with headache after a fall.    TECHNIQUE:  Standard noncontrast CT of the brain. All CT scans at this facility use dose modulation, iterative reconstruction and/or weight based dosing when appropriate to reduce radiation dose to as low as reasonably achievable.    COMPARISON:  None    FINDINGS:  The ventricles are moderately enlarged consistent with volume loss.  No acute edema, hemorrhage or mass effect is present.    Intracranial vascular calcification is noted.    No definite intracranial hemorrhage or subdural hematoma.    The skull appears grossly negative with no evidence of acute fracture.                               X-Ray Chest 1 View (Final result)  Result time 09/20/18 09:08:03   Procedure changed from X-Ray Chest PA And Lateral     Final result by Liang Sunshine MD (09/20/18 09:08:03)                 Impression:      No definite acute finding.      Electronically signed by: Liang Sunshine MD  Date:    09/20/2018  Time:    09:08             Narrative:    EXAMINATION:  XR CHEST 1 VIEW    CLINICAL HISTORY:  Chest trauma with chest pain due to a fall., Fall;    COMPARISON:  None    FINDINGS:  Cardiac size is normal.  Aorta is mildly enlarged and calcified as well as  elongated.    The lung fields appear grossly clear.    The patient is rotated to the right.  Mild thoracic spondylosis.                               X-Ray Cervical Spine AP And Lateral (Final result)  Result time 09/20/18 09:13:12    Final result by KATHIE German Sr., MD (09/20/18 09:13:12)                 Impression:      1. There is grade 1 anterolisthesis of C3 on C4.  2. There are mild degenerative changes between C4 and C7.  .      Electronically signed by: Casey German MD  Date:    09/20/2018  Time:    09:13             Narrative:    EXAMINATION:  XR CERVICAL SPINE AP LATERAL    CLINICAL HISTORY:  Unspecified fall, initial encounter    COMPARISON:  A plain film examination of the thoracic spine performed on 09/20/2018.    FINDINGS:  There is grade 1 anterolisthesis of C3 on C4.  There is no fracture or scoliosis.  There is normal cervical lordosis.  There are mild degenerative changes between C4 and C7.  The prevertebral soft tissue space is normal in appearance.                               X-Ray Thoracic Spine AP Lateral (Final result)  Result time 09/20/18 09:18:20    Final result by KATHIE German Sr., MD (09/20/18 09:18:20)                 Impression:      1. The superior aspect of the thoracic spine is slightly tilted towards the right.  This may be positional.  2. There are mild degenerative changes in the thoracic spine.  3. There is an old-appearing mild anterior wedge defect/fracture of the T12 vertebral body.      Electronically signed by: Casey German MD  Date:    09/20/2018  Time:    09:18             Narrative:    EXAMINATION:  XR THORACIC SPINE AP LATERAL    COMPARISON:  A plain film examination of the cervical spine performed on 09/20/2018.  Comparison was also made to a plain film examination of the lumbar spine performed on 09/20/2018.    FINDINGS:  The superior aspect of the thoracic spine is slightly tilted towards the right.  There is an old-appearing mild anterior wedge  defect/fracture of the T12 vertebral body.  There is no spondylolisthesis in the thoracic spine.  There is normal thoracic kyphosis.  There are mild degenerative changes in the thoracic spine.                               X-Ray Lumbar Spine Ap And Lateral (Final result)  Result time 09/20/18 09:10:10    Final result by KATHIE German Sr., MD (09/20/18 09:10:10)                 Impression:      1. There are mild degenerative changes between T12 and L4 vertebral bodies.  2. There is an old-appearing mild anterior wedge defect/fracture of the T12 vertebral body.  3. There is a moderate amount of atherosclerosis.  4. There is a prominent amount of fecal material within the colon.      Electronically signed by: Casey German MD  Date:    09/20/2018  Time:    09:10             Narrative:    EXAMINATION:  XR LUMBAR SPINE AP AND LATERAL    CLINICAL HISTORY:  T/L-spine trauma, minor-mod, low back pain;    COMPARISON:  None    FINDINGS:  There are 5 lumbar type vertebral bodies. There is no fracture, spondylolisthesis, or scoliosis of the lumbar spine.  There is normal lumbar lordosis.  There are mild degenerative changes between T12 and L4 vertebral bodies.  There is an old-appearing mild anterior wedge defect/fracture of the T12 vertebral body.  There is a moderate amount of atherosclerosis.  There is a prominent amount of fecal material within the colon.                               X-Ray Knee 1 or 2 View Left (Final result)  Result time 09/20/18 09:07:09   Procedure changed from X-Ray Knee Complete 4 or More Views Left     Final result by KATHIE German Sr., MD (09/20/18 09:07:09)                 Impression:      1. No fracture or dislocation  2. There is a moderate amount of atherosclerosis.      Electronically signed by: Casey German MD  Date:    09/20/2018  Time:    09:07             Narrative:    EXAMINATION:  XR KNEE 1 OR 2 VIEW LEFT    CLINICAL HISTORY:  Pain in unspecified  kneefall;    COMPARISON:  None    FINDINGS:  There is no fracture. There is no dislocation.  There is a moderate amount of atherosclerosis.                               X-Ray Hips Bilateral 2 View Incl AP Pelvis (Final result)  Result time 09/20/18 09:05:48    Final result by KATHIE German Sr., MD (09/20/18 09:05:48)                 Impression:      1. There is an acute appearing fracture through the left femoral neck.  2. There is a prominent amount of fecal material within the colon.  3. There is a mild amount of atherosclerosis.      Electronically signed by: Casey German MD  Date:    09/20/2018  Time:    09:05             Narrative:    EXAMINATION:  XR HIPS BILATERAL 2 VIEW INCL AP PELVIS    CLINICAL HISTORY:  Pain in unspecified hip    COMPARISON:  None    FINDINGS:  There is an acute appearing fracture through the left femoral neck.  The left femoral head still articulates with the left acetabulum.  There is a mild amount of atherosclerosis.  There is a prominent amount of fecal material within the colon.                              Assessment/Plan:      * S/P hip hemiarthroplasty    -Dr. Peralta with Orthopedics has been consulted.   -S/p Left hip hemiarthroplasty for femoral neck fracture on 9/22/18  -PT/OT consult  - Monitor hemoglobin  -Social service consult for discharge planning   -Analgesia prn   -ASA for DVT prophylaxis   -H/H stable                 Hypertensive urgency    -Resume home medications.   -IV Hydralazine as needed.   -Bp better controlled   -Resolved         Major depressive disorder    Continue Celexa.           Chronic UTI    -Continue Keflex for prophylaxis.   -Monitor for signs and symptoms of infection.         Dementia without behavioral disturbance    Resume Risperdal    Monitor           VTE Risk Mitigation (From admission, onward)        Ordered     IP VTE HIGH RISK PATIENT  Once      09/22/18 1113     Place sequential compression device  Until discontinued      09/22/18  1113     Place sequential compression device  Until discontinued      09/20/18 1508     Place EVELYN hose  Until discontinued      09/20/18 1246              Rena Pinzon NP  Department of Hospital Medicine   [facility]

## 2018-09-23 NOTE — PLAN OF CARE
Problem: Patient Care Overview  Goal: Plan of Care Review  Outcome: Ongoing (interventions implemented as appropriate)  Patient remained free from injury. Oral meds held, pt too confused, ineffective shallowing. Conversations of all sorts, many not making sense. IVF maintained; No s/s of acute distress. 12hr chart check complete.

## 2018-09-23 NOTE — PLAN OF CARE
Problem: Patient Care Overview  Goal: Plan of Care Review  Outcome: Ongoing (interventions implemented as appropriate)  Pt remains free from injury and falls. Fall precautions in place. Bed/chair alarm intact and functioning when pt in bed or chair. Pt turned Q2 hours. Pt is more alert today but remains disoriented to place, time, and situation. Dressing to left hip clean, dry, and intact. Non verbal indicators of pain absent. Pt does not always follow commands to swallow meds. FELISHA Pinzon NP made aware. Due to voids since buitrago came out this morning. Pt not able to verbalize wants and needs at this time. Bed in low, locked position, call light and personal items within reach of pt. POC discussed with son. Verbalized understanding. VSS. Will continue to monitor.

## 2018-09-23 NOTE — ASSESSMENT & PLAN NOTE
WBAT  Abduction pillow x 48 hours (remove 9/24/18)  Dressing change POD4 - dry sterile gauze and medipore tape  Up with PT  periop abx only  ASA BID for DVT prophylaxis, EVELYN hose, EMMANUELs  Follow-up 2 weeks with me

## 2018-09-23 NOTE — PT/OT/SLP EVAL
Occupational Therapy   Evaluation    Name: Rosalina Thorpe  MRN: [id number]  Admitting Diagnosis:  S/P hip hemiarthroplasty 1 Day Post-Op    Recommendations:     Discharge Recommendations: nursing facility, skilled  Discharge Equipment Recommendations:     Barriers to discharge:       History:     Occupational Profile:  Living Environment: lives assisted  Nursing living facility on special care unit  Previous level of function: assistance with adl's and   Roles and Routines: occupational; therapy  Equipment Used at Home:   tbd  Assistance upon Discharge:     Past Medical History:   Diagnosis Date    Alzheimer disease     Chronic UTI     Essential (primary) hypertension     Glaucoma     Macular degeneration     Major depressive disorder     Renal disorder     Urinary tract infection        Past Surgical History:   Procedure Laterality Date    APPENDECTOMY      EYE SURGERY      cataract surger ou    HYSTERECTOMY         Subjective     Chief Complaint: debility and general;ized weakness  Patient/Family Comments/goals:     Pain/Comfort:  Pain Rating 1: 5/10  Location - Side 1: Left  Location - Orientation 1: generalized  Location 1: leg    Patients cultural, spiritual, Adventism conflicts given the current situation:      Objective:     Communicated with: nurse and epic chart review prior to session.  Patient found all lines intact, call button in reach, chair alarm on, nurse Tracey  notified and nurse perea and son present and   upon OT entry to room.    General Precautions: Standard,     Orthopedic Precautions:    Braces:       Occupational Performance:    Bed Mobility:    · Patient completed Rolling/Turning to Right with maximal assistance  · Patient completed Scooting/Bridging with maximal assistance  · Patient completed Supine to Sit with maximal assistance    Functional Mobility/Transfers:  · Patient completed Sit <> Stand Transfer with maximal assistance and of 2 persons  with  rolling walker  "  · Patient completed Bed <> Chair Transfer using Stand Pivot technique with maximal assistance and of 2 persons with hand-held assist      Activities of Daily Living:  · Feeding:  maximal assistance .  · Upper Body Dressing: moderate assistance .    Cognitive/Visual Perceptual:  Cognitive/Psychosocial Skills:  -       Oriented to: Person   -       Follows Commands/attention:Easily distracted  -       Communication: low voice  -       Memory: Impaired STM, Impaired LTM and Poor immediate recall  -       Safety awareness/insight to disability: impaired   Visual/Perceptual:      -Intact .    Physical Exam:  Upper Extremity Range of Motion:     -       Right Upper Extremity: WFL .  -       Left Upper Extremity: WFL . .  Upper Extremity Strength:    -       Right Upper Extremity: mmt: 3/5 grossly  -       Left Upper Extremity: mmt: 3/5 grossly ..   Strength:    -       Right Upper Extremity: mmt: 3/5 grossly .  -       Left Upper Extremity: mmt: 3/5 grossly. .    AMPAC 6 Click ADL:  AMPAC Total Score: 9    Treatment & Education:    Education:    Patient left up in chair with all lines intact, call button in reach, chair alarm on, nurse brit notified and nurse brit and son present    Assessment:     Rosalina Thorpe is a 89 y.o. female with a medical diagnosis of S/P hip hemiarthroplasty.  She presents with the following performance deficits affecting function: weakness, impaired self care skills, impaired balance, decreased coordination, decreased safety awareness, impaired endurance, impaired functional mobilty, gait instability.      Rehab Prognosis: Fair; patient would benefit from acute skilled OT services to address these deficits and reach maximum level of function.         Clinical Decision Making:     3.  OT High:  "Pt evaluation falls under high complexity for evaluation category due to 5+ performance deficits identified with comprehensive assessments and significant modifications/assistance required. " "An expanded review of history and occupational profile completed in addition to expanded review of physical, cognitive and psychosocial history. Several treatment options considered in care."     Plan:     Patient to be seen 3 x/week to address the above listed problems via    · Plan of Care Expires:    · Plan of Care Reviewed with: patient, son    This Plan of care has been discussed with the patient who was involved in its development and understands and is in agreement with the identified goals and treatment plan    GOALS:   Multidisciplinary Problems     Occupational Therapy Goals        Problem: Occupational Therapy Goal    Goal Priority Disciplines Outcome Interventions   Occupational Therapy Goal     OT, PT/OT     Description:  OT goals to be met by 9-30-18  Min a  with ue dressing  Mod a with bsc t/f's  Mod a with toileting                    Time Tracking:     OT Date of Treatment: 09/23/18  OT Start Time: 1030  OT Stop Time: 1100  OT Total Time (min): 30 min    Billable Minutes:Evaluation 15 minutes  Therapeutic Activity 15 minutes    Elaine Vargas OT  9/23/2018    "

## 2018-09-23 NOTE — SUBJECTIVE & OBJECTIVE
"Principal Problem:S/P hip hemiarthroplasty    Principal Orthopedic Problem: left femoral neck fx    Interval History: left hip gaby 9/22    Review of patient's allergies indicates:  No Known Allergies    Current Facility-Administered Medications   Medication    0.9%  NaCl infusion    acetaminophen tablet 650 mg    aspirin tablet 325 mg    cephALEXin 250 mg/5 mL suspension 250 mg    cetirizine tablet 10 mg    chlorhexidine 0.12 % solution 10 mL    citalopram tablet 10 mg    diltiaZEM 24 hr capsule 240 mg    hydrALAZINE injection 10 mg    HYDROcodone-acetaminophen 5-325 mg per tablet 1 tablet    latanoprost 0.005 % ophthalmic solution 1 drop    losartan tablet 50 mg    metoprolol tartrate (LOPRESSOR) tablet 25 mg    morphine injection 2 mg    nozaseptin (NOZIN) nasal     ondansetron disintegrating tablet 8 mg    potassium, sodium phosphates 280-160-250 mg packet 1 packet    risperiDONE tablet 0.25 mg    sodium chloride 0.9% flush 3 mL     Objective:     Vital Signs (Most Recent):  Temp: 97.8 °F (36.6 °C) (09/23/18 1135)  Pulse: 72 (09/23/18 1135)  Resp: 19 (09/23/18 1135)  BP: (!) 128/58 (09/23/18 1135)  SpO2: 96 % (09/23/18 1135) Vital Signs (24h Range):  Temp:  [97.8 °F (36.6 °C)-100 °F (37.8 °C)] 97.8 °F (36.6 °C)  Pulse:  [72-90] 72  Resp:  [18-20] 19  SpO2:  [90 %-96 %] 96 %  BP: (128-190)/(58-74) 128/58     Weight: 47 kg (103 lb 9.9 oz)  Height: 5' 3" (160 cm)  Body mass index is 18.35 kg/m².      Intake/Output Summary (Last 24 hours) at 9/23/2018 1328  Last data filed at 9/23/2018 1309  Gross per 24 hour   Intake 662.5 ml   Output 650 ml   Net 12.5 ml       Ortho/SPM Exam   NAD  Confused, but alert  LLE: dressings c/d/i. Moving foot/ankle well      Significant Labs: All pertinent labs within the past 24 hours have been reviewed.  None    Significant Imaging: None  "

## 2018-09-23 NOTE — ASSESSMENT & PLAN NOTE
-Dr. Peralta with Orthopedics has been consulted.   -S/p Left hip hemiarthroplasty for femoral neck fracture on 9/22/18  -PT/OT consult  - Monitor hemoglobin  -Social service consult for discharge planning   -Analgesia prn   -ASA for DVT prophylaxis   -H/H stable

## 2018-09-24 PROBLEM — D62 ACUTE BLOOD LOSS ANEMIA: Status: ACTIVE | Noted: 2018-01-01

## 2018-09-24 NOTE — PLAN OF CARE
Problem: Patient Care Overview  Goal: Plan of Care Review  Outcome: Ongoing (interventions implemented as appropriate)  Patient remained free from injury.Tele-sitter in use. Oral meds held, pt too confused, ineffective shallowing. Bladder scanned 238ml .  Conversations of all sorts, many not making sense. IVF maintained; No s/s of acute distress. 12hr chart check complete.

## 2018-09-24 NOTE — PLAN OF CARE
Problem: Patient Care Overview  Goal: Plan of Care Review  Outcome: Ongoing (interventions implemented as appropriate)  Remained free from injury. Tolerating thickened liquids. Received 1U blood this shift, tolerated well. Patient was calm and cooperate until about 1500, when she became very agitated. Patient able to state full and and month/day of birthday. Turning q2 with wedge. avasys at bedside. 12 hour chart check complete.

## 2018-09-24 NOTE — ASSESSMENT & PLAN NOTE
-Dr. Peralta with Orthopedics has been consulted.   -S/p Left hip hemiarthroplasty for femoral neck fracture on 9/22/18  -PT/OT consult  - Monitor hemoglobin  -Social service consult for discharge planning   -Analgesia prn   -ASA for DVT prophylaxis   -H/H 8.9/26.8, will transfuse 1 unit of PRBCs

## 2018-09-24 NOTE — CONSULTS
Sw met with pt and son at bedside. Pt's son provided Sw with list of SNF facilities. Sw placed signed choice form in pt's chart. Sw faxed referrals through [facility]. Sw contacted [facility] at [phone] and facility does not have any open beds at this time.

## 2018-09-24 NOTE — PLAN OF CARE
09/24/18 1439   Medicare Message   Important Message from Medicare regarding Discharge Appeal Rights Given to patient/caregiver;Explained to patient/caregiver;Signed/date by patient/caregiver   Date IMM was signed 09/24/18   Time IMM was signed 1418

## 2018-09-24 NOTE — PROGRESS NOTES
"  [facility] -   Adult Nutrition  Progress Note    SUMMARY     Recommendations    Recommendation/Intervention:   1. Awaiting SLP eval  2. If safe for PO intake, ADAT to Low Na diet w/ consistency per SLP. Recommend Boost Plus - all meal trays if safe for this consistency  3. Will continue to monitor.     Goals: Meet > 85 % EEN/EPN while admitted  Nutrition Goal Status: new  Communication of RD Recs: (POC, sticky note)    Reason for Assessment    Reason for Assessment: RD f/u  Dx:  1. Closed fracture of left hip, initial encounter    2. Hip pain    3. Knee pain    4. Back pain    5. Fall    6. Hypertension, unspecified type    7. Chronic UTI    8. Essential (primary) hypertension    9. Closed fracture of left hip      Past Medical History:   Diagnosis Date    Alzheimer disease     Chronic UTI     Essential (primary) hypertension     Glaucoma     Macular degeneration     Major depressive disorder     Renal disorder     Urinary tract infection        General Information Comments: Pt w/ PMHx of dementia. Pt disoriented. Per Family, pt had good appetite and intake PTA. Family report no recent weight loss. Per epic records, no wt loss. Per physical assessment - No significant muscle/fat wasting noted. Diet just advanced this am.     9.24.18- Pt is POD #2 s/p L hip hemiarthroplasty. Still noted to be confused. Pt sleeping when RD rounded, no one at bedside. Per NP note, pt coughing w/ sips of water. SLP oneil noted in current orders. Pt currently has full liquids ordered. Per nsg flowsheets, pt ate 25% dinner on 9/23.    Nutrition Discharge Planning: Low Na    Nutrition Risk Screen    Nutrition Risk Screen: other (see comments)(macular degeneration. unable to see food)    Nutrition/Diet History    Do you have any cultural, spiritual, Spiritism conflicts, given your current situation?: no    Anthropometrics    Temp: 97.7 °F (36.5 °C)  Height: 5' 3" (160 cm)  Height (inches): 63 in  Weight Method: Bed " Scale  Weight: 47 kg (103 lb 9.9 oz)  Weight (lb): 103.62 lb  Ideal Body Weight (IBW), Female: 115 lb  % Ideal Body Weight, Female (lb): 90.1 lb  BMI (Calculated): 18.4  BMI Grade: 17 - 18.4 protein-energy malnutrition grade I       Lab/Procedures/Meds    Pertinent Labs Reviewed: reviewed  BMP  Lab Results   Component Value Date     09/24/2018    K 3.1 (L) 09/24/2018     09/24/2018    CO2 21 (L) 09/24/2018    BUN 22 09/24/2018    CREATININE 0.6 09/24/2018    CALCIUM 8.1 (L) 09/24/2018    ANIONGAP 8 09/24/2018    ESTGFRAFRICA >60 09/24/2018    EGFRNONAA >60 09/24/2018     Lab Results   Component Value Date    CALCIUM 8.1 (L) 09/24/2018    PHOS 1.9 (L) 09/24/2018   \  Lab Results   Component Value Date    ALBUMIN 3.7 06/15/2018     No results for input(s): POCTGLUCOSE in the last 24 hours..    Pertinent Medications Reviewed: reviewed    Physical Findings/Assessment    Overall Physical Appearance: (thin)  Oral/Mouth Cavity: (WDL)  Skin: (Juarez score 13)    Estimated/Assessed Needs    Weight Used For Calorie Calculations: 47 kg (103 lb 9.9 oz)  Energy Calorie Requirements (kcal): 1645 - 1880  Energy Need Method: Kcal/kg  Protein Requirements: 56 g  Weight Used For Protein Calculations: 47 kg (103 lb 9.9 oz)     Fluid Need Method: RDA Method(or per MD)  RDA Method (mL): 1645         Nutrition Prescription Ordered    Current Diet Order: Full liquids    Evaluation of Received Nutrient/Fluid Intake          Intake/Output Summary (Last 24 hours) at 9/24/2018 1042  Last data filed at 9/24/2018 0500  Gross per 24 hour   Intake 1363.33 ml   Output --   Net 1363.33 ml       % Intake of Estimated Energy Needs: 25 - 50 %  % Meal Intake: 25 - 50 %    Nutrition Risk      2xweekly    Assessment and Plan      Nutrition Problem  Decreased nutrient needs (sodium)    Related to (etiology):   Past medical hx (HTN)    Signs and Symptoms (as evidenced by):   BP Readings from Last 3 Encounters:   09/24/18 (!) 157/69   06/15/18  (!) 173/77     Interventions/Recommendations (treatment strategy):  See above    Nutrition Diagnosis Status:   Continues      Monitor and Evaluation    Food and Nutrient Intake: energy intake, food and beverage intake  Food and Nutrient Adminstration: diet order  Anthropometric Measurements: weight  Biochemical Data, Medical Tests and Procedures: electrolyte and renal panel, glucose/endocrine profile  Nutrition-Focused Physical Findings: overall appearance     Nutrition Follow-Up    RD Follow-up?: Yes(2xweekly)

## 2018-09-24 NOTE — PROGRESS NOTES
[facility] Hospital Medicine  Progress Note    Patient Name: Rosalina Thorpe  MRN: [id number]  Patient Class: IP- Inpatient   Admission Date: 9/20/2018  Length of Stay: 4 days  Attending Physician: Tre Monreal, *  Primary Care Provider: Richar Block MD        Subjective:     Principal Problem:S/P hip hemiarthroplasty    HPI:  Rosalina Thorpe is a 89 year old female with hypertension and dementia who presented to the ED with complaints of left hip, left knee and back pain. Reportedly, the patient was found in her bed at the [facility] where she resides complaining of pain. There was not a witnessed fall and the patient's bed alarm did not activate. Due to dementia, the patient is unable to provide any history. Evaluation of the patient's pain in the ED included CT head, x-rays of her left knee, bilateral hips and spine. Due to findings of an acute fracture of the left femoral neck, a CT scan of the hip was performed and confirmed a left subcapital femoral neck fracture with superior displacement of the intertrochanteric femur with respect to the femoral head. Other imaging studies were negative for acute findings. Labs and EKG in the ED were unremarkable with the exception of an 18,390 WBC count. The patient's son, Tim Thorpe, is her medical power of  and the patient is a DNR.     Hospital Course:  Patient admitted for closed fracture of left hip. CT scan of the hip was performed and confirmed a left subcapital femoral neck fracture with superior displacement of the intertrochanteric femur with respect to the femoral head. Orthopedics consulted. Left hip hemiarthroplasty planned for tomorrow. Patient s/p left hip hemiarthroplasty for femoral neck fracture on 9/22/18 per Dr. Bueno. Patient tolerated procedure well. VSS. Pain controlled. PT/OT consult POD #1, H/H stable, K+3.3, will replete. Social work consult for discharge planning (SNF). POD #2, H/H 8.9/26.8,  will transfuse 1 unit of PRBCs. Will replete K+. Patient  coughing with sips of water. Will consult ST for swallow evaluation. Awaiting SNF placement.                 Interval History: No acute events overnight. H/H 8.9/26.8, will transfuse 1 unit of PRBCs. Swallow evaluation pending. Awaiting SNF placement.     Review of Systems   Unable to perform ROS: Dementia     Objective:     Vital Signs (Most Recent):  Temp: 97.7 °F (36.5 °C) (09/24/18 0720)  Pulse: 78 (09/24/18 0720)  Resp: 16 (09/24/18 0720)  BP: (!) 157/69 (09/24/18 0720)  SpO2: (!) 94 % (09/24/18 0720) Vital Signs (24h Range):  Temp:  [97.7 °F (36.5 °C)-98.9 °F (37.2 °C)] 97.7 °F (36.5 °C)  Pulse:  [72-82] 78  Resp:  [16-22] 16  SpO2:  [92 %-97 %] 94 %  BP: (128-167)/(58-84) 157/69     Weight: 47 kg (103 lb 9.9 oz)  Body mass index is 18.35 kg/m².    Intake/Output Summary (Last 24 hours) at 9/24/2018 1011  Last data filed at 9/24/2018 0500  Gross per 24 hour   Intake 1363.33 ml   Output --   Net 1363.33 ml      Physical Exam   Constitutional: She appears well-developed and well-nourished. No distress.   HENT:   Head: Normocephalic and atraumatic.   Eyes: Conjunctivae are normal.   Neck: Neck supple. No JVD present.   Cardiovascular: Normal rate, regular rhythm and normal heart sounds.   Pulses:       Dorsalis pedis pulses are 2+ on the right side, and 2+ on the left side.        Posterior tibial pulses are 2+ on the right side, and 2+ on the left side.   Pulmonary/Chest: Effort normal and breath sounds normal.   Abdominal: Soft. Bowel sounds are normal. She exhibits no distension. There is no tenderness.   Musculoskeletal:        Left hip: She exhibits decreased range of motion and tenderness.        Legs:  Left Hip -dressing intact, 2 plus dorsalis pedis and posterior tibial artery pulses, light touch intact Left lower extremity.  All digits are warm. No erythema, no warmth, no drainage, no swelling, no significant tenderness.  Less than 2 seconds  capillary refill all digits.   Neurological: She is alert. She is disoriented.   Skin: Skin is warm and dry.   Small bruises to bilateral lower extremities.  + Left hip bruising   Psychiatric: She has a normal mood and affect. Her speech is normal and behavior is normal. Cognition and memory are impaired.   Confused    Nursing note and vitals reviewed.      Significant Labs:   BMP:   Recent Labs   Lab  09/24/18   0454   GLU  103   NA  138   K  3.1*   CL  109   CO2  21*   BUN  22   CREATININE  0.6   CALCIUM  8.1*   MG  1.7     CBC:   Recent Labs   Lab  09/22/18   1151  09/23/18   0547  09/24/18   0454   WBC  13.79*  12.61  11.67   HGB  11.1*  9.6*  8.9*   HCT  33.4*  28.9*  26.8*   PLT  197  214  236     CMP:   Recent Labs   Lab  09/22/18   1151  09/23/18   0547  09/24/18   0454   NA  136  136  138   K  3.7  3.3*  3.1*   CL  106  105  109   CO2  18*  20*  21*   GLU  156*  101  103   BUN  22  22  22   CREATININE  0.8  0.7  0.6   CALCIUM  8.7  8.1*  8.1*   ANIONGAP  12  11  8   EGFRNONAA  >60  >60  >60     All pertinent labs within the past 24 hours have been reviewed.    Significant Imaging:   Imaging Results          CT Hip Without Contrast Left (Final result)  Result time 09/20/18 09:22:12    Final result by Liang Sunshine MD (09/20/18 09:22:12)                 Impression:      Acute subcapital left femoral neck fracture with displacement.    Underlying osteopenia.    Possible chronic S1 sacral fracture.    Sacroiliitis.  Lumbar degenerative disc disease and facet arthritis.    Incidental demonstration of sigmoid diverticulosis.      Electronically signed by: Liang Sunshine MD  Date:    09/20/2018  Time:    09:22             Narrative:    EXAMINATION:  CT HIP WITHOUT CONTRAST LEFT    CLINICAL HISTORY:  Trauma with left femoral neck fracture.  >    TECHNIQUE:  CT of the pelvis without contrast with coronal and sagittal reformats.  All CT scans at this facility are performed  using dose modulation techniques  as appropriate to performed exam including the following:  automated exposure control; adjustment of mA and/or kV according to the patients size (this includes techniques or standardized protocols for targeted exams where dose is matched to indication/reason for exam: i.e. extremities or head);  iterative reconstruction technique.    COMPARISON:  None    FINDINGS:  Aortic atherosclerosis is noted.  Lumbar degenerative disc disease and facet arthritis.  Mild bilateral sacroiliitis.    Generalized demineralization suggests osteopenia.  Sagittal reformat reveals a questionable chronic S1/coccyx fracture.    A left subcapital femoral neck fracture is present with superior displacement of the intertrochanteric femur with respect to the femoral head.    The acetabulum is intact.  The pubic rami are intact.    The right femoral neck is normal.                               CT Head Without Contrast (Final result)  Result time 09/20/18 09:19:13    Final result by Liang Sunshine MD (09/20/18 09:19:13)                 Impression:      Moderately advanced age-related atrophy.  No acute findings.      Electronically signed by: Liang Sunshine MD  Date:    09/20/2018  Time:    09:19             Narrative:    EXAMINATION:  CT HEAD WITHOUT CONTRAST    CLINICAL HISTORY:  Head injury with headache after a fall.    TECHNIQUE:  Standard noncontrast CT of the brain. All CT scans at this facility use dose modulation, iterative reconstruction and/or weight based dosing when appropriate to reduce radiation dose to as low as reasonably achievable.    COMPARISON:  None    FINDINGS:  The ventricles are moderately enlarged consistent with volume loss.  No acute edema, hemorrhage or mass effect is present.    Intracranial vascular calcification is noted.    No definite intracranial hemorrhage or subdural hematoma.    The skull appears grossly negative with no evidence of acute fracture.                               X-Ray Chest 1 View  (Final result)  Result time 09/20/18 09:08:03   Procedure changed from X-Ray Chest PA And Lateral     Final result by Liang Sunshine MD (09/20/18 09:08:03)                 Impression:      No definite acute finding.      Electronically signed by: Liang Sunshine MD  Date:    09/20/2018  Time:    09:08             Narrative:    EXAMINATION:  XR CHEST 1 VIEW    CLINICAL HISTORY:  Chest trauma with chest pain due to a fall., Fall;    COMPARISON:  None    FINDINGS:  Cardiac size is normal.  Aorta is mildly enlarged and calcified as well as elongated.    The lung fields appear grossly clear.    The patient is rotated to the right.  Mild thoracic spondylosis.                               X-Ray Cervical Spine AP And Lateral (Final result)  Result time 09/20/18 09:13:12    Final result by KATHIE German Sr., MD (09/20/18 09:13:12)                 Impression:      1. There is grade 1 anterolisthesis of C3 on C4.  2. There are mild degenerative changes between C4 and C7.  .      Electronically signed by: Casey German MD  Date:    09/20/2018  Time:    09:13             Narrative:    EXAMINATION:  XR CERVICAL SPINE AP LATERAL    CLINICAL HISTORY:  Unspecified fall, initial encounter    COMPARISON:  A plain film examination of the thoracic spine performed on 09/20/2018.    FINDINGS:  There is grade 1 anterolisthesis of C3 on C4.  There is no fracture or scoliosis.  There is normal cervical lordosis.  There are mild degenerative changes between C4 and C7.  The prevertebral soft tissue space is normal in appearance.                               X-Ray Thoracic Spine AP Lateral (Final result)  Result time 09/20/18 09:18:20    Final result by KATHIE German Sr., MD (09/20/18 09:18:20)                 Impression:      1. The superior aspect of the thoracic spine is slightly tilted towards the right.  This may be positional.  2. There are mild degenerative changes in the thoracic spine.  3. There is an old-appearing  mild anterior wedge defect/fracture of the T12 vertebral body.      Electronically signed by: Casey German MD  Date:    09/20/2018  Time:    09:18             Narrative:    EXAMINATION:  XR THORACIC SPINE AP LATERAL    COMPARISON:  A plain film examination of the cervical spine performed on 09/20/2018.  Comparison was also made to a plain film examination of the lumbar spine performed on 09/20/2018.    FINDINGS:  The superior aspect of the thoracic spine is slightly tilted towards the right.  There is an old-appearing mild anterior wedge defect/fracture of the T12 vertebral body.  There is no spondylolisthesis in the thoracic spine.  There is normal thoracic kyphosis.  There are mild degenerative changes in the thoracic spine.                               X-Ray Lumbar Spine Ap And Lateral (Final result)  Result time 09/20/18 09:10:10    Final result by KATHIE German Sr., MD (09/20/18 09:10:10)                 Impression:      1. There are mild degenerative changes between T12 and L4 vertebral bodies.  2. There is an old-appearing mild anterior wedge defect/fracture of the T12 vertebral body.  3. There is a moderate amount of atherosclerosis.  4. There is a prominent amount of fecal material within the colon.      Electronically signed by: Casey German MD  Date:    09/20/2018  Time:    09:10             Narrative:    EXAMINATION:  XR LUMBAR SPINE AP AND LATERAL    CLINICAL HISTORY:  T/L-spine trauma, minor-mod, low back pain;    COMPARISON:  None    FINDINGS:  There are 5 lumbar type vertebral bodies. There is no fracture, spondylolisthesis, or scoliosis of the lumbar spine.  There is normal lumbar lordosis.  There are mild degenerative changes between T12 and L4 vertebral bodies.  There is an old-appearing mild anterior wedge defect/fracture of the T12 vertebral body.  There is a moderate amount of atherosclerosis.  There is a prominent amount of fecal material within the colon.                                X-Ray Knee 1 or 2 View Left (Final result)  Result time 09/20/18 09:07:09   Procedure changed from X-Ray Knee Complete 4 or More Views Left     Final result by KATHIE German Sr., MD (09/20/18 09:07:09)                 Impression:      1. No fracture or dislocation  2. There is a moderate amount of atherosclerosis.      Electronically signed by: Casey German MD  Date:    09/20/2018  Time:    09:07             Narrative:    EXAMINATION:  XR KNEE 1 OR 2 VIEW LEFT    CLINICAL HISTORY:  Pain in unspecified kneefall;    COMPARISON:  None    FINDINGS:  There is no fracture. There is no dislocation.  There is a moderate amount of atherosclerosis.                               X-Ray Hips Bilateral 2 View Incl AP Pelvis (Final result)  Result time 09/20/18 09:05:48    Final result by KATHIE German Sr., MD (09/20/18 09:05:48)                 Impression:      1. There is an acute appearing fracture through the left femoral neck.  2. There is a prominent amount of fecal material within the colon.  3. There is a mild amount of atherosclerosis.      Electronically signed by: Casey German MD  Date:    09/20/2018  Time:    09:05             Narrative:    EXAMINATION:  XR HIPS BILATERAL 2 VIEW INCL AP PELVIS    CLINICAL HISTORY:  Pain in unspecified hip    COMPARISON:  None    FINDINGS:  There is an acute appearing fracture through the left femoral neck.  The left femoral head still articulates with the left acetabulum.  There is a mild amount of atherosclerosis.  There is a prominent amount of fecal material within the colon.                              Assessment/Plan:      * S/P hip hemiarthroplasty    -Dr. Peralta with Orthopedics has been consulted.   -S/p Left hip hemiarthroplasty for femoral neck fracture on 9/22/18  -PT/OT consult  - Monitor hemoglobin  -Social service consult for discharge planning   -Analgesia prn   -ASA for DVT prophylaxis   -H/H 8.9/26.8, will transfuse 1 unit of PRBCs             Acute  blood loss anemia    S/p left hip hemiarthroplasty on 9/22/18  H/H 8.9/26.8  Will transfuse 1 unit of PRBCs   Monitor H/H           Hypertensive urgency    -Resume home medications.   -IV Hydralazine as needed.   -Bp better controlled            Major depressive disorder    Continue Celexa.           Chronic UTI    -Continue Keflex for prophylaxis.   -Monitor for signs and symptoms of infection.         Dementia without behavioral disturbance    Resume Risperdal    Monitor           VTE Risk Mitigation (From admission, onward)        Ordered     IP VTE HIGH RISK PATIENT  Once      09/22/18 1113     Place sequential compression device  Until discontinued      09/22/18 1113     Place sequential compression device  Until discontinued      09/20/18 1508     Place EVELYN hose  Until discontinued      09/20/18 1246              Rena Pinzon NP  Department of Hospital Medicine   [facility]

## 2018-09-24 NOTE — PT/OT/SLP PROGRESS
Physical Therapy  Treatment    Rosalina Thorpe   MRN: [id number]   Admitting Diagnosis: S/P hip hemiarthroplasty    PT Received On: 09/24/18  PT Start Time: 1255     PT Stop Time: 1310    PT Total Time (min): 15 min       Billable Minutes:  Therapeutic Exercise 15    Treatment Type: Treatment  PT/PTA: PTA     PTA Visit Number: 2       General Precautions: Standard, fall, aspiration  Orthopedic Precautions: LLE weight bearing as tolerated   Braces: (hip pillow wedge while supine)    Do you have any cultural, spiritual, Zoroastrianism conflicts, given your current situation?: no    Subjective:  Communicated with NURSE, THALIA AND Malathi  prior to session.  PATIENT AGREE TO TX NOW.    Pain/Comfort  Pain Rating 1: 3/10  Location - Side 1: Left  Location - Orientation 1: lower  Location 1: hip  Pain Addressed 1: Pre-medicate for activity, Reposition, Distraction, Cessation of Activity  Pain Rating Post-Intervention 1: 3/10    Objective:   Patient found with: peripheral IV, telemetry, ASLEEP EASILY AWAKENED, RECEIVING BLOOD AT PRESENT TIME.    Functional Mobility:  OOB T/FS, BED MOBILITY  DEFERRED DUE TO RECEIVING BLOOD AT PRESENT TIME.    Balance:   ACTIVITY DEFERRED DUE TO RECEIVING BLOOD AT PRESENT TIME.    Therapeutic Activities and Exercises:  AAROM TO LLE SUPINE ALL AVAILABLE PLANES IN HIP PERCAUTION ALLOWED ROM., DOFFED HI ABD PILLOW WEDGE BEFORE TX , DONNED AFTER TX. AROM WITH VISUAL AND VERBAL CUES FOR FOLLOW THROUGH ALL AVAILBLE PLANES. FLOATED KATRINA HEELS WITH PILLOW AFTER TX.    AM-PAC 6 CLICK MOBILITY  How much help from another person does this patient currently need?   1 = Unable, Total/Dependent Assistance  2 = A lot, Maximum/Moderate Assistance  3 = A little, Minimum/Contact Guard/Supervision  4 = None, Modified Bliss/Independent    Turning over in bed (including adjusting bedclothes, sheets and blankets)?: 2  Sitting down on and standing up from a chair with arms (e.g., wheelchair, bedside commode, etc.):  2  Moving from lying on back to sitting on the side of the bed?: 2  Moving to and from a bed to a chair (including a wheelchair)?: 2  Need to walk in hospital room?: 2  Climbing 3-5 steps with a railing?: 1  Basic Mobility Total Score: 11    AM-PAC Raw Score CMS G-Code Modifier Level of Impairment Assistance   6 % Total / Unable   7 - 9 CM 80 - 100% Maximal Assist   10 - 14 CL 60 - 80% Moderate Assist   15 - 19 CK 40 - 60% Moderate Assist   20 - 22 CJ 20 - 40% Minimal Assist   23 CI 1-20% SBA / CGA   24 CH 0% Independent/ Mod I     Patient left supine with all lines intact and call button in reach., BED ALARM ON.    Assessment:  Rosalina Thorpe is a 89 y.o. female with a medical diagnosis of S/P hip hemiarthroplasty , RECEIVING BLOOD, WBAT LLE.    Rehab identified problem list/impairments: Rehab identified problem list/impairments: weakness, impaired functional mobilty, impaired cognition, decreased safety awareness, pain, impaired endurance, gait instability, impaired sensation, impaired balance, impaired self care skills, decreased lower extremity function    Rehab potential is good.    Activity tolerance: Good    Discharge recommendations: Discharge Facility/Level Of Care Needs: nursing facility, skilled     Barriers to discharge:      Equipment recommendations: Equipment Needed After Discharge: walker, rolling, wheelchair     GOALS:   Multidisciplinary Problems     Physical Therapy Goals        Problem: Physical Therapy Goal    Goal Priority Disciplines Outcome Goal Variances Interventions   Physical Therapy Goal     PT, PT/OT Ongoing (interventions implemented as appropriate)     Description:  LTGs to be met within 7 days (9/30/18):  1.  Patient will perform bed mobility with mod assist  2.  Patient will perform functional transfers with mod assist and RW  3.  Patient will ambulate 25 feet with RW with mod assist and no gross LOB  4.  Patient will perform BLE therapeutic exercise 10 x 2 in all planes with  cues/assist as needed                    PLAN:    Patient to be seen 5 x/week  to address the above listed problems via gait training, therapeutic activities, therapeutic exercises  Plan of Care expires: 09/30/18  Plan of Care reviewed with: patient         Kamille Lowe, PTA  09/24/2018

## 2018-09-24 NOTE — PT/OT/SLP EVAL
Speech Language Pathology Evaluation  Bedside Swallow    Patient Name:  Rosalina Thorpe   MRN:  [id number]  Admitting Diagnosis: S/P hip hemiarthroplasty    Recommendations:                 General Recommendations:  Dysphagia therapy  Diet recommendations:  Mechanical soft, No Bread, No Rice, Chopped meat, Thin   Aspiration Precautions: 1 bite/sip at a time, Assistance with meals and Assistance with thickening liquids, Double swallow with each bite/sip, Eliminate distractions, Feed only when awake/alert, HOB to 90 degrees, Meds crushed in puree, Monitor for s/s of aspiration, Remain upright 30 minutes post meal and Small bites/sips   General Precautions: Standard, aspiration, fall    History:     Past Medical History:   Diagnosis Date    Alzheimer disease     Chronic UTI     Essential (primary) hypertension     Glaucoma     Macular degeneration     Major depressive disorder     Renal disorder     Urinary tract infection        Past Surgical History:   Procedure Laterality Date    APPENDECTOMY      EYE SURGERY      cataract surger ou    HYSTERECTOMY         Social History: Patient lives at assisted living.    Prior diet: per pt's son, pt was tolerating a regular diet.    Subjective   Pt was seen lying in bed with her son present.   Patient goals: none reported     Pain/Comfort:  · Pain Rating 1: 0/10    Objective:     Oral Musculature Evaluation  · Oral Musculature: general weakness  · Dentition: present and adequate    Bedside Swallow Eval:   Consistencies Assessed:  · Thin liquids, nectar thick liquids, honey thick liquids, and solids      Oral Phase:   · Dry mouth  · Prolonged mastication  · Oral residue    Pharyngeal Phase:   · decreased hyolaryngeal excursion to palpation  · multiple spontaneous swallows  · throat clearing and facial changes with thin liquids and nectar thick liquids which could indicate possible aspiration      Assessment:     Rosalina Thorpe is a 89 y.o. female with an SLP diagnosis of  Dysphagia and Cognitive-Linguistic Impairment.  She presents with decreased oral and pharyngeal musculature with signs of suspected aspiration during intake of thin liquids and nectar thick liquids.  Pt presented increased swallow safety with soft solids and honey thick liquids.  Pt will benefit from continued skilled ST for dysphagia therapy.     Goals:   Multidisciplinary Problems     SLP Goals        Problem: SLP Goal    Goal Priority Disciplines Outcome   SLP Goal     SLP    Description:  1. Pt will complete oral and pharyngeal ex with min A for increased strength and ROM  2. Pt will tolerate least restrictive diet without incidence  3. Ongoing swallow assessment to determine diet tolerance                    Plan:     · Patient to be seen:  3 x/week   · Plan of Care expires:  10/01/18  · Plan of Care reviewed with:      · SLP Follow-Up:  Yes       Discharge recommendations:  nursing facility, skilled   Barriers to Discharge:  None    Time Tracking:     SLP Treatment Date:   09/24/18  Speech Start Time:  1015  Speech Stop Time:  1045     Speech Total Time (min):  30 min    Billable Minutes: Eval Swallow and Oral Function 30    Liane Patel CCC-SLP  09/24/2018

## 2018-09-24 NOTE — PLAN OF CARE
Problem: Physical Therapy Goal  Goal: Physical Therapy Goal  LTGs to be met within 7 days (9/30/18):  1.  Patient will perform bed mobility with mod assist  2.  Patient will perform functional transfers with mod assist and RW  3.  Patient will ambulate 25 feet with RW with mod assist and no gross LOB  4.  Patient will perform BLE therapeutic exercise 10 x 2 in all planes with cues/assist as needed   Outcome: Ongoing (interventions implemented as appropriate)  PATIENT DID WELL WITH OOB T/FS TO STATIC STANDING , STEP ACTIVITY, SHORTSEATED BALANCE ACTIVITY ALL AT MAX ASSIST X1 TO 2 FOR SAFETY, UTILIZING RW AND HHA X2 AT TIMES, WBAT ,LLE. HIP PERCAUTIONS ADHERED TO ALL TX WITH VERBAL AND VISUAL REMINDERS TO PATIENT.

## 2018-09-24 NOTE — SUBJECTIVE & OBJECTIVE
Interval History: No acute events overnight. H/H 8.9/26.8, will transfuse 1 unit of PRBCs. Swallow evaluation pending. Awaiting SNF placement.     Review of Systems   Unable to perform ROS: Dementia     Objective:     Vital Signs (Most Recent):  Temp: 97.7 °F (36.5 °C) (09/24/18 0720)  Pulse: 78 (09/24/18 0720)  Resp: 16 (09/24/18 0720)  BP: (!) 157/69 (09/24/18 0720)  SpO2: (!) 94 % (09/24/18 0720) Vital Signs (24h Range):  Temp:  [97.7 °F (36.5 °C)-98.9 °F (37.2 °C)] 97.7 °F (36.5 °C)  Pulse:  [72-82] 78  Resp:  [16-22] 16  SpO2:  [92 %-97 %] 94 %  BP: (128-167)/(58-84) 157/69     Weight: 47 kg (103 lb 9.9 oz)  Body mass index is 18.35 kg/m².    Intake/Output Summary (Last 24 hours) at 9/24/2018 1011  Last data filed at 9/24/2018 0500  Gross per 24 hour   Intake 1363.33 ml   Output --   Net 1363.33 ml      Physical Exam   Constitutional: She appears well-developed and well-nourished. No distress.   HENT:   Head: Normocephalic and atraumatic.   Eyes: Conjunctivae are normal.   Neck: Neck supple. No JVD present.   Cardiovascular: Normal rate, regular rhythm and normal heart sounds.   Pulses:       Dorsalis pedis pulses are 2+ on the right side, and 2+ on the left side.        Posterior tibial pulses are 2+ on the right side, and 2+ on the left side.   Pulmonary/Chest: Effort normal and breath sounds normal.   Abdominal: Soft. Bowel sounds are normal. She exhibits no distension. There is no tenderness.   Musculoskeletal:        Left hip: She exhibits decreased range of motion and tenderness.        Legs:  Left Hip -dressing intact, 2 plus dorsalis pedis and posterior tibial artery pulses, light touch intact Left lower extremity.  All digits are warm. No erythema, no warmth, no drainage, no swelling, no significant tenderness.  Less than 2 seconds capillary refill all digits.   Neurological: She is alert. She is disoriented.   Skin: Skin is warm and dry.   Small bruises to bilateral lower extremities.  + Left hip  bruising   Psychiatric: She has a normal mood and affect. Her speech is normal and behavior is normal. Cognition and memory are impaired.   Confused    Nursing note and vitals reviewed.      Significant Labs:   BMP:   Recent Labs   Lab  09/24/18   0454   GLU  103   NA  138   K  3.1*   CL  109   CO2  21*   BUN  22   CREATININE  0.6   CALCIUM  8.1*   MG  1.7     CBC:   Recent Labs   Lab  09/22/18   1151  09/23/18   0547  09/24/18   0454   WBC  13.79*  12.61  11.67   HGB  11.1*  9.6*  8.9*   HCT  33.4*  28.9*  26.8*   PLT  197  214  236     CMP:   Recent Labs   Lab  09/22/18   1151  09/23/18   0547  09/24/18   0454   NA  136  136  138   K  3.7  3.3*  3.1*   CL  106  105  109   CO2  18*  20*  21*   GLU  156*  101  103   BUN  22  22  22   CREATININE  0.8  0.7  0.6   CALCIUM  8.7  8.1*  8.1*   ANIONGAP  12  11  8   EGFRNONAA  >60  >60  >60     All pertinent labs within the past 24 hours have been reviewed.    Significant Imaging:   Imaging Results          CT Hip Without Contrast Left (Final result)  Result time 09/20/18 09:22:12    Final result by Liang Sunshine MD (09/20/18 09:22:12)                 Impression:      Acute subcapital left femoral neck fracture with displacement.    Underlying osteopenia.    Possible chronic S1 sacral fracture.    Sacroiliitis.  Lumbar degenerative disc disease and facet arthritis.    Incidental demonstration of sigmoid diverticulosis.      Electronically signed by: Liang Sunshine MD  Date:    09/20/2018  Time:    09:22             Narrative:    EXAMINATION:  CT HIP WITHOUT CONTRAST LEFT    CLINICAL HISTORY:  Trauma with left femoral neck fracture.  >    TECHNIQUE:  CT of the pelvis without contrast with coronal and sagittal reformats.  All CT scans at this facility are performed  using dose modulation techniques as appropriate to performed exam including the following:  automated exposure control; adjustment of mA and/or kV according to the patients size (this includes  techniques or standardized protocols for targeted exams where dose is matched to indication/reason for exam: i.e. extremities or head);  iterative reconstruction technique.    COMPARISON:  None    FINDINGS:  Aortic atherosclerosis is noted.  Lumbar degenerative disc disease and facet arthritis.  Mild bilateral sacroiliitis.    Generalized demineralization suggests osteopenia.  Sagittal reformat reveals a questionable chronic S1/coccyx fracture.    A left subcapital femoral neck fracture is present with superior displacement of the intertrochanteric femur with respect to the femoral head.    The acetabulum is intact.  The pubic rami are intact.    The right femoral neck is normal.                               CT Head Without Contrast (Final result)  Result time 09/20/18 09:19:13    Final result by Liang Sunshine MD (09/20/18 09:19:13)                 Impression:      Moderately advanced age-related atrophy.  No acute findings.      Electronically signed by: Liang Sunshine MD  Date:    09/20/2018  Time:    09:19             Narrative:    EXAMINATION:  CT HEAD WITHOUT CONTRAST    CLINICAL HISTORY:  Head injury with headache after a fall.    TECHNIQUE:  Standard noncontrast CT of the brain. All CT scans at this facility use dose modulation, iterative reconstruction and/or weight based dosing when appropriate to reduce radiation dose to as low as reasonably achievable.    COMPARISON:  None    FINDINGS:  The ventricles are moderately enlarged consistent with volume loss.  No acute edema, hemorrhage or mass effect is present.    Intracranial vascular calcification is noted.    No definite intracranial hemorrhage or subdural hematoma.    The skull appears grossly negative with no evidence of acute fracture.                               X-Ray Chest 1 View (Final result)  Result time 09/20/18 09:08:03   Procedure changed from X-Ray Chest PA And Lateral     Final result by Liang Sunshine MD (09/20/18 09:08:03)                  Impression:      No definite acute finding.      Electronically signed by: Liang Sunshine MD  Date:    09/20/2018  Time:    09:08             Narrative:    EXAMINATION:  XR CHEST 1 VIEW    CLINICAL HISTORY:  Chest trauma with chest pain due to a fall., Fall;    COMPARISON:  None    FINDINGS:  Cardiac size is normal.  Aorta is mildly enlarged and calcified as well as elongated.    The lung fields appear grossly clear.    The patient is rotated to the right.  Mild thoracic spondylosis.                               X-Ray Cervical Spine AP And Lateral (Final result)  Result time 09/20/18 09:13:12    Final result by KATHIE German Sr., MD (09/20/18 09:13:12)                 Impression:      1. There is grade 1 anterolisthesis of C3 on C4.  2. There are mild degenerative changes between C4 and C7.  .      Electronically signed by: Casey German MD  Date:    09/20/2018  Time:    09:13             Narrative:    EXAMINATION:  XR CERVICAL SPINE AP LATERAL    CLINICAL HISTORY:  Unspecified fall, initial encounter    COMPARISON:  A plain film examination of the thoracic spine performed on 09/20/2018.    FINDINGS:  There is grade 1 anterolisthesis of C3 on C4.  There is no fracture or scoliosis.  There is normal cervical lordosis.  There are mild degenerative changes between C4 and C7.  The prevertebral soft tissue space is normal in appearance.                               X-Ray Thoracic Spine AP Lateral (Final result)  Result time 09/20/18 09:18:20    Final result by KATHIE German Sr., MD (09/20/18 09:18:20)                 Impression:      1. The superior aspect of the thoracic spine is slightly tilted towards the right.  This may be positional.  2. There are mild degenerative changes in the thoracic spine.  3. There is an old-appearing mild anterior wedge defect/fracture of the T12 vertebral body.      Electronically signed by: Casey German MD  Date:    09/20/2018  Time:    09:18              Narrative:    EXAMINATION:  XR THORACIC SPINE AP LATERAL    COMPARISON:  A plain film examination of the cervical spine performed on 09/20/2018.  Comparison was also made to a plain film examination of the lumbar spine performed on 09/20/2018.    FINDINGS:  The superior aspect of the thoracic spine is slightly tilted towards the right.  There is an old-appearing mild anterior wedge defect/fracture of the T12 vertebral body.  There is no spondylolisthesis in the thoracic spine.  There is normal thoracic kyphosis.  There are mild degenerative changes in the thoracic spine.                               X-Ray Lumbar Spine Ap And Lateral (Final result)  Result time 09/20/18 09:10:10    Final result by KATHIE German Sr., MD (09/20/18 09:10:10)                 Impression:      1. There are mild degenerative changes between T12 and L4 vertebral bodies.  2. There is an old-appearing mild anterior wedge defect/fracture of the T12 vertebral body.  3. There is a moderate amount of atherosclerosis.  4. There is a prominent amount of fecal material within the colon.      Electronically signed by: Casey German MD  Date:    09/20/2018  Time:    09:10             Narrative:    EXAMINATION:  XR LUMBAR SPINE AP AND LATERAL    CLINICAL HISTORY:  T/L-spine trauma, minor-mod, low back pain;    COMPARISON:  None    FINDINGS:  There are 5 lumbar type vertebral bodies. There is no fracture, spondylolisthesis, or scoliosis of the lumbar spine.  There is normal lumbar lordosis.  There are mild degenerative changes between T12 and L4 vertebral bodies.  There is an old-appearing mild anterior wedge defect/fracture of the T12 vertebral body.  There is a moderate amount of atherosclerosis.  There is a prominent amount of fecal material within the colon.                               X-Ray Knee 1 or 2 View Left (Final result)  Result time 09/20/18 09:07:09   Procedure changed from X-Ray Knee Complete 4 or More Views Left     Final result by  KATHIE German Sr., MD (09/20/18 09:07:09)                 Impression:      1. No fracture or dislocation  2. There is a moderate amount of atherosclerosis.      Electronically signed by: Casey German MD  Date:    09/20/2018  Time:    09:07             Narrative:    EXAMINATION:  XR KNEE 1 OR 2 VIEW LEFT    CLINICAL HISTORY:  Pain in unspecified kneefall;    COMPARISON:  None    FINDINGS:  There is no fracture. There is no dislocation.  There is a moderate amount of atherosclerosis.                               X-Ray Hips Bilateral 2 View Incl AP Pelvis (Final result)  Result time 09/20/18 09:05:48    Final result by KATHIE German Sr., MD (09/20/18 09:05:48)                 Impression:      1. There is an acute appearing fracture through the left femoral neck.  2. There is a prominent amount of fecal material within the colon.  3. There is a mild amount of atherosclerosis.      Electronically signed by: Casey German MD  Date:    09/20/2018  Time:    09:05             Narrative:    EXAMINATION:  XR HIPS BILATERAL 2 VIEW INCL AP PELVIS    CLINICAL HISTORY:  Pain in unspecified hip    COMPARISON:  None    FINDINGS:  There is an acute appearing fracture through the left femoral neck.  The left femoral head still articulates with the left acetabulum.  There is a mild amount of atherosclerosis.  There is a prominent amount of fecal material within the colon.

## 2018-09-24 NOTE — PLAN OF CARE
Problem: Patient Care Overview  Goal: Plan of Care Review  Outcome: Ongoing (interventions implemented as appropriate)  Recommendations    Recommendation/Intervention:   1. Awaiting SLP eval  2. If safe for PO intake, ADAT to Low Na diet w/ consistency per SLP. Recommend Boost Plus - all meal trays if safe for this consistency  3. Will continue to monitor.     Goals: Meet > 85 % EEN/EPN while admitted  Nutrition Goal Status: new  Communication of RD Recs: (POC, sticky note)

## 2018-09-24 NOTE — PT/OT/SLP PROGRESS
Physical Therapy  Treatment    Rosalina Thorpe   MRN: [id number]   Admitting Diagnosis: S/P hip hemiarthroplasty    PT Received On: 09/24/18  PT Start Time: 1015     PT Stop Time: 1045    PT Total Time (min): 30 min       Billable Minutes:  Gait Training 15 and Therapeutic Exercise 15    Treatment Type: Treatment  PT/PTA: PTA     PTA Visit Number: 1       General Precautions: Standard, fall  Orthopedic Precautions: LLE weight bearing as tolerated   Braces: N/A    Do you have any cultural, spiritual, Anabaptism conflicts, given your current situation?: no    Subjective:  Communicated with NURSE THALIA AND Epic  prior to session.  PATIENT AGREE TO TX NOW.    Pain/Comfort  Pain Rating 1: 6/10  Location - Side 1: Left  Location - Orientation 1: lower  Location 1: hip  Pain Addressed 1: Pre-medicate for activity, Reposition, Distraction, Cessation of Activity, Nurse notified  Pain Rating Post-Intervention 1: 6/10    Objective:   Patient found with: peripheral IV, telemetry    Functional Mobility:  Bed Mobility:    SUPINE TO SIT , SIT TO SUPINE AT MAX ASSISTX2 FOR SAFETY, PATIENT EASILY CONFUSED.    Transfers:   SIT TO STAND ,STAND TO SIT AT MAX ASSISTX2, PATIENT WITH STRONG LEFT LATERAL LEAN DUE TO PAIN LEVEL AT PRESENT TIME, NURSETHALIA NOTIFIED.    Gait:    STATIC STANDING , ATTEMPTING STEP ACTIVITIES WITH RW AT MAX ASSIST X2, WBAT, LLE, ALSO ATTEMPTED AT HHAX2 AT MAX ASSISTX2.    Stairs:  N/A    Balance:   Static Sit: POOR+: Needs MINIMAL assist to maintain  Dynamic Sit: FAIR: Cannot move trunk without losing balance  Static Stand: POOR: Needs MODERATE assist to maintain  Dynamic stand: POOR: Needs MOD (moderate) assist during gait     Therapeutic Activities and Exercises:  KATRINA LE EXERCISES WITH IN HIP PERCAUTIONS RANGE OF MOTION, ABD PILLOW DONNED AFTER TX ,T/FS OOB TO STATIC STANDING, SHORTSEATED BALANCE ACTIVITY WITH SCOOTING AT MAX ASSIST.    AM-PAC 6 CLICK MOBILITY  How much help from another person does this patient  currently need?   1 = Unable, Total/Dependent Assistance  2 = A lot, Maximum/Moderate Assistance  3 = A little, Minimum/Contact Guard/Supervision  4 = None, Modified [place]/Independent    Turning over in bed (including adjusting bedclothes, sheets and blankets)?: 2  Sitting down on and standing up from a chair with arms (e.g., wheelchair, bedside commode, etc.): 2  Moving from lying on back to sitting on the side of the bed?: 2  Moving to and from a bed to a chair (including a wheelchair)?: 2  Need to walk in hospital room?: 2  Climbing 3-5 steps with a railing?: 1  Basic Mobility Total Score: 11    AM-PAC Raw Score CMS G-Code Modifier Level of Impairment Assistance   6 % Total / Unable   7 - 9 CM 80 - 100% Maximal Assist   10 - 14 CL 60 - 80% Moderate Assist   15 - 19 CK 40 - 60% Moderate Assist   20 - 22 CJ 20 - 40% Minimal Assist   23 CI 1-20% SBA / CGA   24 CH 0% Independent/ Mod I     Patient left supine with all lines intact, call button in reach and bed alarm on.    Assessment:  Rosalina Thorpe is a 89 y.o. female with a medical diagnosis of S/P hip hemiarthroplasty . PATIENT EASILY CONFUSED , REQUIRES EXTENDED TIME TO PROCESS ONE STEP COMMANDS.    Rehab identified problem list/impairments: Rehab identified problem list/impairments: weakness, gait instability, decreased lower extremity function, impaired balance, impaired endurance, impaired sensation, decreased safety awareness, impaired self care skills, impaired cognition, impaired functional mobilty, decreased coordination    Rehab potential is good.    Activity tolerance: Fair    Discharge recommendations: Discharge Facility/Level Of Care Needs: nursing facility, skilled     Barriers to discharge:      Equipment recommendations: Equipment Needed After Discharge: walker, rolling, wheelchair     GOALS:   Multidisciplinary Problems     Physical Therapy Goals        Problem: Physical Therapy Goal    Goal Priority Disciplines Outcome Goal Variances  Interventions   Physical Therapy Goal     PT, PT/OT Ongoing (interventions implemented as appropriate)     Description:  LTGs to be met within 7 days (9/30/18):  1.  Patient will perform bed mobility with mod assist  2.  Patient will perform functional transfers with mod assist and RW  3.  Patient will ambulate 25 feet with RW with mod assist and no gross LOB  4.  Patient will perform BLE therapeutic exercise 10 x 2 in all planes with cues/assist as needed                    PLAN:    Patient to be seen 5 x/week  to address the above listed problems via gait training, therapeutic activities, therapeutic exercises  Plan of Care expires: 09/30/18  Plan of Care reviewed with: patient         Kamille Lowe, PTA  09/24/2018

## 2018-09-24 NOTE — PT/OT/SLP PROGRESS
Occupational Therapy  Treatment    Rosalina Thorpe   MRN: [id number]   Admitting Diagnosis: S/P hip hemiarthroplasty    OT Date of Treatment: 09/24/18   OT Start Time: 1010  OT Stop Time: 1040  OT Total Time (min): 30 min    Billable Minutes:  Therapeutic Activity 30 minutes    General Precautions: Standard, fall  Orthopedic Precautions: N/A  Braces: N/A         Subjective:  Communicated with nurse and epic chart review prior to session.    Pain/Comfort  Pain Rating 1: 5/10  Location - Side 1: Left  Location - Orientation 1: generalized  Location 1: hip    Objective:        Functional Mobility:  Bed Mobility:    max a x 2  Transfers:    max a x2    Functional Ambulation: na    Activities of Daily Living:     Feeding adaptive equipment: na     UE adaptive equipment:  Mod a      LE adaptive equipment: na                    Bathing adaptive equipment: na    Balance:   Static Sit: FAIR+: Able to take MINIMAL challenges from all directions  Dynamic Sit: FAIR+: Maintains balance through MINIMAL excursions of active trunk motion  Static Stand: 0: Needs MAXIMAL assist to maintain   Dynamic stand: poor-    Therapeutic Activities and Exercises:  Pt seen in room. Pt req max a x 2 with supine<>sit t/f's. Pt req max a x 2 with sit<>stand and mod to max a with scooting eob and max a x2 with scooting up higher in bed. Pt req max a with rolling L<>R .. Pt left with all needs met and call button in reach. Pt with bed alarm on and echo monitor system.     AM-PAC 6 CLICK ADL   How much help from another person does this patient currently need?   1 = Unable, Total/Dependent Assistance  2 = A lot, Maximum/Moderate Assistance  3 = A little, Minimum/Contact Guard/Supervision  4 = None, Modified Luce/Independent    Putting on and taking off regular lower body clothing? : 1  Bathing (including washing, rinsing, drying)?: 1  Toileting, which includes using toilet, bedpan, or urinal? : 1  Putting on and taking off regular upper body  "clothing?: 2  Taking care of personal grooming such as brushing teeth?: 2  Eating meals?: 2  Daily Activity Total Score: 9     AM-PAC Raw Score CMS "G-Code Modifier Level of Impairment Assistance   6 % Total / Unable   7 - 8 CM 80 - 100% Maximal Assist   9-13 CL 60 - 80% Moderate Assist   14 - 19 CK 40 - 60% Moderate Assist   20 - 22 CJ 20 - 40% Minimal Assist   23 CI 1-20% SBA / CGA   24 CH 0% Independent/ Mod I       Patient left HOB elevated with all lines intact, call button in reach, nurse notified and echo system    ASSESSMENT:  Rosalina Thorpe is a 89 y.o. female with a medical diagnosis of S/P hip hemiarthroplasty and presents with debility, impaired functional t/f's and mobility. Pt may continue to benefit from skilled o.t.    Rehab identified problem list/impairments: Rehab identified problem list/impairments: weakness, impaired functional mobilty, impaired cognition, decreased upper extremity function, decreased safety awareness, impaired endurance, impaired self care skills, gait instability, decreased lower extremity function    Rehab potential is fair.    Activity tolerance: Fair    Discharge recommendations: Discharge Facility/Level Of Care Needs: nursing facility, skilled     Barriers to discharge: Barriers to Discharge: None    Equipment recommendations: walker, rolling, wheelchair     GOALS:   Multidisciplinary Problems     Occupational Therapy Goals        Problem: Occupational Therapy Goal    Goal Priority Disciplines Outcome Interventions   Occupational Therapy Goal     OT, PT/OT Ongoing (interventions implemented as appropriate)    Description:  OT goals to be met by 9-30-18  Min a  with ue dressing  Mod a with bsc t/f's  Mod a with toileting                    Plan:  Patient to be seen 3 x/week to address the above listed problems via self-care/home management, therapeutic exercises, therapeutic activities  Plan of Care expires:    Plan of Care reviewed with: patient         Elaine " Sam, OT  09/24/2018

## 2018-09-25 NOTE — PLAN OF CARE
Problem: SLP Goal  Goal: SLP Goal  1. Pt will complete oral and pharyngeal ex with min A for increased strength and ROM  2. Pt will tolerate least restrictive diet without incidence  3. Ongoing swallow assessment to determine diet tolerance   Outcome: Ongoing (interventions implemented as appropriate)  Pt tolerated honey thick liquids without s/s of aspiration

## 2018-09-25 NOTE — PLAN OF CARE
Antonio faxed pt's pasrr to [facility] aging awaiting a form 142 from [facility]. Pt was admitted to [facility].

## 2018-09-25 NOTE — PROGRESS NOTES
Pt back in bed. Pt family feeding pt after educating on soft mechanical diet. Honey thick liquids and making sure pt's bed is elevated and slow small bites. Verbalized understanding. Demonstrated. Report called to [facility] skilled nursing. Spoke with andrés rojas.

## 2018-09-25 NOTE — PROGRESS NOTES
"[facility]  Orthopedics  Progress Note    Patient Name: Rosalina Thorpe  MRN: [id number]  Admission Date: 9/20/2018  Hospital Length of Stay: 4 days  Attending Provider: Tre Monreal, *  Primary Care Provider: Richar Block MD  Follow-up For: Procedure(s) (LRB):  HEMIARTHROPLASTY, HIP (Left)  CAPSULOTOMY, JOINT (Left)    Post-Operative Day: 2 Days Post-Op  Subjective:     Principal Problem:S/P hip hemiarthroplasty    Principal Orthopedic Problem: L hip gaby    Interval History:     Review of patient's allergies indicates:  No Known Allergies    Current Facility-Administered Medications   Medication    0.9%  NaCl infusion (for blood administration)    0.9%  NaCl infusion    acetaminophen tablet 650 mg    aspirin tablet 325 mg    cephALEXin 250 mg/5 mL suspension 250 mg    cetirizine tablet 10 mg    chlorhexidine 0.12 % solution 10 mL    citalopram tablet 10 mg    diltiaZEM 24 hr capsule 240 mg    hydrALAZINE injection 10 mg    HYDROcodone-acetaminophen 5-325 mg per tablet 1 tablet    latanoprost 0.005 % ophthalmic solution 1 drop    losartan tablet 50 mg    metoprolol tartrate (LOPRESSOR) tablet 25 mg    morphine injection 2 mg    nozaseptin (NOZIN) nasal     ondansetron disintegrating tablet 8 mg    potassium, sodium phosphates 280-160-250 mg packet 1 packet    risperiDONE tablet 0.25 mg    sodium chloride 0.9% flush 3 mL     Objective:     Vital Signs (Most Recent):  Temp: 98.1 °F (36.7 °C) (09/24/18 1949)  Pulse: 80 (09/24/18 1949)  Resp: 17 (09/24/18 1949)  BP: (!) 153/66 (09/24/18 1949)  SpO2: (!) 94 % (09/24/18 1949) Vital Signs (24h Range):  Temp:  [97.6 °F (36.4 °C)-98.6 °F (37 °C)] 98.1 °F (36.7 °C)  Pulse:  [66-80] 80  Resp:  [16-22] 17  SpO2:  [92 %-96 %] 94 %  BP: (127-167)/(59-92) 153/66     Weight: 47 kg (103 lb 9.9 oz)  Height: 5' 3" (160 cm)  Body mass index is 18.35 kg/m².      Intake/Output Summary (Last 24 hours) at 9/24/2018 6250  Last data " filed at 9/24/2018 1830  Gross per 24 hour   Intake 2321.25 ml   Output --   Net 2321.25 ml       Ortho/SPM Exam   NAD  LLE:  Wound dressed, c/d/i. Moving foot/ankle    Significant Labs: All pertinent labs within the past 24 hours have been reviewed.    Significant Imaging: None    Assessment/Plan:     * S/P hip hemiarthroplasty    WBAT  Abduction pillow x 48 hours (remove 9/24/18)  Dressing change POD4 - dry sterile gauze and medipore tape  Up with PT  periop abx only  ASA BID for DVT prophylaxis, EVELYN hose, SCDs  Follow-up 2 weeks with david Bueno MD  Orthopedics  [facility]

## 2018-09-25 NOTE — SUBJECTIVE & OBJECTIVE
"Principal Problem:S/P hip hemiarthroplasty    Principal Orthopedic Problem: L hip gaby    Interval History:     Review of patient's allergies indicates:  No Known Allergies    Current Facility-Administered Medications   Medication    0.9%  NaCl infusion (for blood administration)    0.9%  NaCl infusion    acetaminophen tablet 650 mg    aspirin tablet 325 mg    cephALEXin 250 mg/5 mL suspension 250 mg    cetirizine tablet 10 mg    chlorhexidine 0.12 % solution 10 mL    citalopram tablet 10 mg    diltiaZEM 24 hr capsule 240 mg    hydrALAZINE injection 10 mg    HYDROcodone-acetaminophen 5-325 mg per tablet 1 tablet    latanoprost 0.005 % ophthalmic solution 1 drop    losartan tablet 50 mg    metoprolol tartrate (LOPRESSOR) tablet 25 mg    morphine injection 2 mg    nozaseptin (NOZIN) nasal     ondansetron disintegrating tablet 8 mg    potassium, sodium phosphates 280-160-250 mg packet 1 packet    risperiDONE tablet 0.25 mg    sodium chloride 0.9% flush 3 mL     Objective:     Vital Signs (Most Recent):  Temp: 98.1 °F (36.7 °C) (09/24/18 1949)  Pulse: 80 (09/24/18 1949)  Resp: 17 (09/24/18 1949)  BP: (!) 153/66 (09/24/18 1949)  SpO2: (!) 94 % (09/24/18 1949) Vital Signs (24h Range):  Temp:  [97.6 °F (36.4 °C)-98.6 °F (37 °C)] 98.1 °F (36.7 °C)  Pulse:  [66-80] 80  Resp:  [16-22] 17  SpO2:  [92 %-96 %] 94 %  BP: (127-167)/(59-92) 153/66     Weight: 47 kg (103 lb 9.9 oz)  Height: 5' 3" (160 cm)  Body mass index is 18.35 kg/m².      Intake/Output Summary (Last 24 hours) at 9/24/2018 2211  Last data filed at 9/24/2018 1830  Gross per 24 hour   Intake 2321.25 ml   Output --   Net 2321.25 ml       Ortho/SPM Exam   NAD  LLE:  Wound dressed, c/d/i. Moving foot/ankle    Significant Labs: All pertinent labs within the past 24 hours have been reviewed.    Significant Imaging: None  "

## 2018-09-25 NOTE — DISCHARGE SUMMARY
[facility] Hospital Medicine  Discharge Summary      Patient Name: Rosalina Thorpe  MRN: [id number]  Admission Date: 9/20/2018  Hospital Length of Stay: 5 days  Discharge Date and Time:  09/25/2018 10:36 AM  Attending Physician: Tre Monreal, *   Discharging Provider: Rena Pinzon NP  Primary Care Provider: Richar Block MD      HPI:   Rosalina Thorpe is a 89 year old female with hypertension and dementia who presented to the ED with complaints of left hip, left knee and back pain. Reportedly, the patient was found in her bed at the [facility] where she resides complaining of pain. There was not a witnessed fall and the patient's bed alarm did not activate. Due to dementia, the patient is unable to provide any history. Evaluation of the patient's pain in the ED included CT head, x-rays of her left knee, bilateral hips and spine. Due to findings of an acute fracture of the left femoral neck, a CT scan of the hip was performed and confirmed a left subcapital femoral neck fracture with superior displacement of the intertrochanteric femur with respect to the femoral head. Other imaging studies were negative for acute findings. Labs and EKG in the ED were unremarkable with the exception of an 18,390 WBC count. The patient's son, Tim Thorpe, is her medical power of  and the patient is a DNR.     Procedure(s) (LRB):  HEMIARTHROPLASTY, HIP (Left)  CAPSULOTOMY, JOINT (Left)      Hospital Course:   Patient admitted for closed fracture of left hip. CT scan of the hip was performed and confirmed a left subcapital femoral neck fracture with superior displacement of the intertrochanteric femur with respect to the femoral head. Orthopedics consulted. Left hip hemiarthroplasty planned for tomorrow. Patient s/p left hip hemiarthroplasty for femoral neck fracture on 9/22/18 per Dr. Bueno. Patient tolerated procedure well. VSS. Pain controlled. PT/OT consult. ASA BID for DVT  prophylaxis. POD #1, H/H stable, K+3.3, will replete. Social work consult for discharge planning (SNF). POD #2, H/H 8.9/26.8, will transfuse 1 unit of PRBCs. Will replete K+. Patient  coughing with sips of water. Will consult ST for swallow evaluation. Awaiting SNF placement. ST recommends mechanical soft and honey thick liquids. POD #3, H/H stable. Patient accepted at the [facility]. Home meds reconciled. Patient to follow-up with PCP in 3 days for hospital follow-up. Patient also to follow-up with Dr. Bueno (Orthopedic) for post-op follow-up in 2 weeks. Patient seen and examined on the date of discharge and found suitable for discharge.                      Consults:   Consults (From admission, onward)        Status Ordering Provider     Inpatient consult to Orthopedic Surgery  Once     Provider:  José Miguel Peralta    Completed RHIANNA BARDALES     Inpatient consult to Social Work  Once     Provider:  (Not yet assigned)    ALBARO Kilgore          No new Assessment & Plan notes have been filed under this hospital service since the last note was generated.  Service: Hospital Medicine    Final Active Diagnoses:    Diagnosis Date Noted POA    PRINCIPAL PROBLEM:  S/P hip hemiarthroplasty [Z96.649] 09/20/2018 Not Applicable    Acute blood loss anemia [D62] 09/24/2018 No    Dementia without behavioral disturbance [F03.90] 09/20/2018 Yes    Chronic UTI [N39.0]  Yes    Hypertensive urgency [I16.0]  Yes    Major depressive disorder [F32.9]  Yes      Problems Resolved During this Admission:    Diagnosis Date Noted Date Resolved POA    Leukocytosis [D72.829] 09/20/2018 09/23/2018 Yes       Discharged Condition: stable    Disposition: Home or Self Care    Follow Up:  Follow-up Information     Awa Marcos NP In 3 days.    Specialty:  Family Medicine  Why:  hospital follow-up   Contact information:  [address] [facility] [address] [phone]             Hi Bueno MD  In 2 weeks.    Specialty:  Orthopedic Surgery  Why:  post-op follow-up   Contact information:  [address] [facility]  [address]  [facility] [address] [phone]                 Patient Instructions:      Notify your health care provider if you experience any of the following:  temperature >100.4     Notify your health care provider if you experience any of the following:  severe uncontrolled pain     Notify your health care provider if you experience any of the following:  redness, tenderness, or signs of infection (pain, swelling, redness, odor or green/yellow discharge around incision site)     Notify your health care provider if you experience any of the following:  difficulty breathing or increased cough     Activity as tolerated       Significant Diagnostic Studies: Labs:   BMP:   Recent Labs   Lab  09/24/18   0454  09/25/18   0607   GLU  103  97   NA  138  142   K  3.1*  4.0   CL  109  113*   CO2  21*  19*   BUN  22  23   CREATININE  0.6  0.7   CALCIUM  8.1*  8.3*   MG  1.7  1.8   , CMP   Recent Labs   Lab  09/24/18   0454  09/25/18   0607   NA  138  142   K  3.1*  4.0   CL  109  113*   CO2  21*  19*   GLU  103  97   BUN  22  23   CREATININE  0.6  0.7   CALCIUM  8.1*  8.3*   ANIONGAP  8  10   ESTGFRAFRICA  >60  >60   EGFRNONAA  >60  >60   , CBC   Recent Labs   Lab  09/24/18   0454  09/25/18   0606   WBC  11.67  12.49   HGB  8.9*  10.3*   HCT  26.8*  31.1*   PLT  236  267    and All labs within the past 24 hours have been reviewed    Pending Diagnostic Studies:     None         Medications:  Reconciled Home Medications:      Medication List      START taking these medications    aspirin 325 MG tablet  Take 1 tablet (325 mg total) by mouth 2 (two) times daily.  Replaces:  aspirin 81 MG Chew     nozaseptin nasal   Commonly known as:  NOZIN  1 each by Each Nare route 2 (two) times daily.        CHANGE how you take these medications    citalopram 10 MG tablet  Commonly known  as:  CELEXA  Take 10 mg by mouth 2 (two) times daily.  What changed:  Another medication with the same name was removed. Continue taking this medication, and follow the directions you see here.        CONTINUE taking these medications    acetaminophen 500 MG tablet  Commonly known as:  TYLENOL  Take 1,000 mg by mouth 3 (three) times daily as needed for Pain.     cephALEXin 250 MG capsule  Commonly known as:  KEFLEX  Take 250 mg by mouth once daily.     diltiaZEM 240 MG 24 hr capsule  Commonly known as:  CARDIZEM CD  Take 240 mg by mouth nightly.     HYZAAR 50-12.5 mg per tablet  Generic drug:  losartan-hydrochlorothiazide 50-12.5 mg  Take 1 tablet by mouth once daily.     latanoprost 0.005 % ophthalmic solution  Place 1 drop into both eyes every evening.     loratadine 10 mg tablet  Commonly known as:  CLARITIN  Take 10 mg by mouth once daily.     megestrol 20 MG Tab  Commonly known as:  MEGACE  Take 20 mg by mouth once daily.     melatonin 10 mg Tbsr  Take by mouth.     metoprolol tartrate 25 MG tablet  Commonly known as:  LOPRESSOR  Take 25 mg by mouth 2 (two) times daily.     polyethylene glycol 17 gram Pwpk  Commonly known as:  GLYCOLAX  Take 17 g by mouth 2 (two) times daily.     risperiDONE 0.25 MG Tab  Commonly known as:  RISPERDAL  Take 0.25 mg by mouth 2 (two) times daily.        STOP taking these medications    aspirin 81 MG Chew  Replaced by:  aspirin 325 MG tablet     donepezil 5 MG tablet  Commonly known as:  ARICEPT     dorzolamide-timolol 2-0.5% 22.3-6.8 mg/mL ophthalmic solution  Commonly known as:  COSOPT            Indwelling Lines/Drains at time of discharge:   Lines/Drains/Airways          None          Time spent on the discharge of patient: 40 minutes  Patient was seen and examined on the date of discharge and determined to be suitable for discharge.         Rena Pinzon NP  Department of Hospital Medicine  [facility]

## 2018-09-25 NOTE — PLAN OF CARE
Problem: Patient Care Overview  Goal: Plan of Care Review  Outcome: Ongoing (interventions implemented as appropriate)  IVF/Meds given per MD order. NV checks q4. Pt denies pain during shift; Pt turned q2 hours; Cardiac Monitered (NSR-70-80's)Dressing to the left hip CDI. The bed in low position; Call light within reach; No s/s of acute distress;Will continue to monitor

## 2018-09-25 NOTE — PLAN OF CARE
Problem: Physical Therapy Goal  Goal: Physical Therapy Goal  LTGs to be met within 7 days (9/30/18):  1.  Patient will perform bed mobility with mod assist  2.  Patient will perform functional transfers with mod assist and RW  3.  Patient will ambulate 25 feet with RW with mod assist and no gross LOB  4.  Patient will perform BLE therapeutic exercise 10 x 2 in all planes with cues/assist as needed   Outcome: Ongoing (interventions implemented as appropriate)  PT PERFORMED STAND PIVOT T/F FROM EOB TO BEDSIDE CHAIR HH ASSIST MOD A X 2

## 2018-09-25 NOTE — PROGRESS NOTES
Pt dressed. Assisted up to wheelchair. Transport in room. Iv removed. Cath tip intact. Tele removed. Pt down via wheelchair to transport vehicle. Pt did have c/o pain to lt hip. Tylenol 650mg administered po crushed and in pudding.  prn. Tolerated.

## 2018-09-25 NOTE — PT/OT/SLP PROGRESS
Speech Language Pathology Treatment    Patient Name:  Rosalina Thorpe   MRN:  [id number]  Admitting Diagnosis: S/P hip hemiarthroplasty    Recommendations:                 General Recommendations:  Dysphagia therapy  Diet recommendations:  Mechanical soft, Chopped meat, No Rice, No Bread, Liquid Diet Level: Honey Thick   Aspiration Precautions: 1 bite/sip at a time, Assistance with meals, Assistance with thickening liquids, Avoid talking while eating, HOB to 90 degrees and Small bites/sips   General Precautions: Standard, aspiration, fall  Communication strategies:  none    Subjective     Pt verbose and pleasant. Seen sitting up in chair with son present in room.  Patient goals: unable to state    Pain/Comfort:  · Pain Rating 1: 0/10  · Pain Rating Post-Intervention 1: 0/10    Objective:     Has the patient been evaluated by SLP for swallowing?      Keep patient NPO?     Current Respiratory Status: room air      Pt and son educated on purpose of ex and how to use thickener as well as diet recommendations. He expressed understanding. She completed tongue base retraction and laryngeal elevation ex x 20 each with max cues to improve swallow function. She required max verbal and tactile cues with model to attempt lingual ex with little success. She requested water and tolerated honey thick liquids without difficulty via cup rim.     Assessment:     Rosalina Thorpe is a 89 y.o. female with an SLP diagnosis of Dysphagia.  She presents with risk of aspiration.    Goals:   Multidisciplinary Problems     SLP Goals        Problem: SLP Goal    Goal Priority Disciplines Outcome   SLP Goal     SLP Ongoing (interventions implemented as appropriate)   Description:  1. Pt will complete oral and pharyngeal ex with min A for increased strength and ROM  2. Pt will tolerate least restrictive diet without incidence  3. Ongoing swallow assessment to determine diet tolerance                    Plan:     · Patient to be seen:  3 x/week   · Plan of  Care expires:  10/01/18  · Plan of Care reviewed with:  patient, son   · SLP Follow-Up:  Yes       Discharge recommendations:  nursing facility, skilled   Barriers to Discharge:  None    Time Tracking:     SLP Treatment Date:   09/25/18  Speech Start Time:  0957  Speech Stop Time:  1021     Speech Total Time (min):  24 min    Billable Minutes: Treatment Swallowing Dysfunction 24    Myra Mack CCC-SLP  09/25/2018

## 2018-09-25 NOTE — PLAN OF CARE
Antonio received 142 from [facility]. Antonio faxed dc orders, AVS and 142 to Tracey at the [facility]. Tracey called and reports she received information and will arrange pt's transportation. Antonio contacted pt's son Tim Thorpe and he reports he is at [employer] picking up her belongings and he will be on the way to [facility].

## 2018-09-25 NOTE — PT/OT/SLP PROGRESS
Physical Therapy  Treatment    Rosalina Thorpe   MRN: [id number]   Admitting Diagnosis: S/P hip hemiarthroplasty    PT Received On: 09/25/18  PT Start Time: 0931     PT Stop Time: 0955    PT Total Time (min): 24 min       Billable Minutes:  Therapeutic Activity 14 and Therapeutic Exercise 10    Treatment Type: Treatment  PT/PTA: PT     PTA Visit Number: 0        General Precautions: Standard, fall  Orthopedic Precautions: LLE weight bearing as tolerated   Braces: N/A    Do you have any cultural, spiritual, Rastafarian conflicts, given your current situation?: no    Subjective:  Communicated with NURSE GAVIN AND [facility] CHART REVIEW prior to session.  PT FOUND SUPINE IN BED    Pain/Comfort  Pain Rating 1: 0/10    Objective:   Patient found with: peripheral IV, telemetry    Functional Mobility:  PT FOUND SUPINE IN BED AND UNINTELLIGIBLE SPEECH. PT PERFORMED SUP>SIT HOB ELEVATED COMPLETELY AND MAX A TO SCOOT EOB. PT SIT>STAND X 2 ATTEMTS WITH L LATERAL LEAN AND POSTERIOR PUSH WITH INC VC FOR CORRECTION. PT PERFORMED STAND PIVOT T/F FROM EOB TO BEDSIDE CHAIR HH ASSIST MOD A X 2. PT PERFORMED SEATED THEREX X 15 REPETITIONS INCLUDING MIP, TKE, AND APS WITH POOR PARTICIPATION. PT PERFORMED SIT<>STAND T/F X 3 TRIALS WITH HH ASSIST AND MOD A X 2. CALL BELL IN REACH AND SPEECH THERAPIST BEGINNING TX UPON PT COMPLETION.     AM-PAC 6 CLICK MOBILITY  How much help from another person does this patient currently need?   1 = Unable, Total/Dependent Assistance  2 = A lot, Maximum/Moderate Assistance  3 = A little, Minimum/Contact Guard/Supervision  4 = None, Modified Jay/Independent    Turning over in bed (including adjusting bedclothes, sheets and blankets)?: 2  Sitting down on and standing up from a chair with arms (e.g., wheelchair, bedside commode, etc.): 2  Moving from lying on back to sitting on the side of the bed?: 2  Moving to and from a bed to a chair (including a wheelchair)?: 2  Need to walk in hospital room?:  1  Climbing 3-5 steps with a railing?: 1  Basic Mobility Total Score: 10    AM-PAC Raw Score CMS G-Code Modifier Level of Impairment Assistance   6 % Total / Unable   7 - 9 CM 80 - 100% Maximal Assist   10 - 14 CL 60 - 80% Moderate Assist   15 - 19 CK 40 - 60% Moderate Assist   20 - 22 CJ 20 - 40% Minimal Assist   23 CI 1-20% SBA / CGA   24 CH 0% Independent/ Mod I     Patient left up in chair with all lines intact, call button in reach, chair alarm on and SPEECH THERAPIST present.    Assessment:  PT TOLERATED P.T. TODAY AND WILL CONTINUE TO BENEFIT FROM ADDITIONAL P.T. TX TO ADDRESS IMPAIRMENTS.     Rehab identified problem list/impairments: Rehab identified problem list/impairments: weakness, gait instability, impaired endurance, impaired balance, decreased lower extremity function, decreased safety awareness, impaired self care skills, impaired functional mobilty, impaired cognition    Rehab potential is fair.    Activity tolerance: Fair    Discharge recommendations: Discharge Facility/Level Of Care Needs: nursing facility, skilled     Equipment recommendations: Equipment Needed After Discharge: walker, rolling     GOALS:   Multidisciplinary Problems     Physical Therapy Goals        Problem: Physical Therapy Goal    Goal Priority Disciplines Outcome Goal Variances Interventions   Physical Therapy Goal     PT, PT/OT Ongoing (interventions implemented as appropriate)     Description:  LTGs to be met within 7 days (9/30/18):  1.  Patient will perform bed mobility with mod assist  2.  Patient will perform functional transfers with mod assist and RW  3.  Patient will ambulate 25 feet with RW with mod assist and no gross LOB  4.  Patient will perform BLE therapeutic exercise 10 x 2 in all planes with cues/assist as needed                    PLAN:    Patient to be seen 5 x/week  to address the above listed problems via gait training, therapeutic activities, therapeutic exercises  Plan of Care expires:  09/30/18  Plan of Care reviewed with: patient         Preethi Tesfaye Rubio, SPT  09/25/2018

## 2018-09-26 NOTE — PLAN OF CARE
09/26/18 0921   Final Note   Assessment Type Final Discharge Note   Discharge Disposition SNF   Right Care Referral Info   Post Acute Recommendation SNF / Sub-Acute Rehab  ([facility])

## 2018-11-19 NOTE — ED NOTES
I telephoned patient's son, Tim, to obtain the name of the family's  home.  He advised that he is driving to [facility] and is currently near [place].  I told him that patient would be held here in the ED until his arrival.  [facility] [place], [phone], and spoke with Thea.  Someone with the home will be sent to [facility]; I advised that the patient should be in the morgue by the time of the arrival.

## 2018-11-19 NOTE — ED NOTES
Copy of pt's records from [facility], including Do Not Resuscitate order, scanned and emailed to patient's chart.  Hard copy placed in patient's physical chart.

## 2018-11-19 NOTE — ED PROVIDER NOTES
SCRIBE #1 NOTE: I, Yash Diop, am scribing for, and in the presence of, Izabella Overton MD. I have scribed the entire note.       History     Chief Complaint   Patient presents with    Altered Mental Status     resident of nursing home found altered this morning, worsening since then; hypotensive, tachycardic, tachypneic, warm to touch     Review of patient's allergies indicates:  No Known Allergies      History of Present Illness     HPI    11/19/2018, 12:13 PM  History obtained from the EMS   HPI limited secondary to AMS.      History of Present Illness: Rosalina Thorpe is a 89 y.o. female patient with a PMHx of alzheimer disease, chronic UTI, renal disorder, and major depressive disorder who presents to the Emergency Department for evaluation of AMS . Pt was reportedly found altered this morning at a nursing home facility. Pt presents to ED for further evaluation due to being hypotensive, tachycardic, tachypneic, and warm to touch. HPI limited due to AMS. Pt is reportedly a DNR pt. Ill contact pt's family for more information.       Arrival mode: EMS    PCP: Richar Block MD        Past Medical History:  Past Medical History:   Diagnosis Date    Alzheimer disease     Chronic UTI     Essential (primary) hypertension     Glaucoma     Macular degeneration     Major depressive disorder     Renal disorder     Urinary tract infection        Past Surgical History:  Past Surgical History:   Procedure Laterality Date    APPENDECTOMY      CAPSULOTOMY OF JOINT Left 9/22/2018    Procedure: CAPSULOTOMY, JOINT;  Surgeon: Hi Bueno MD;  Location: [facility] OR;  Service: Orthopedics;  Laterality: Left;    CAPSULOTOMY, JOINT Left 9/22/2018    Performed by Hi Bueno MD at [facility] OR    EYE SURGERY      cataract surger ou    HEMIARTHROPLASTY OF HIP Left 9/22/2018    Procedure: HEMIARTHROPLASTY, HIP;  Surgeon: Hi Bueno MD;  Location: [facility] OR;  Service: Orthopedics;  Laterality: Left;     HEMIARTHROPLASTY, HIP Left 9/22/2018    Performed by Hi Bueno MD at [facility] OR    HYSTERECTOMY           Family History:  History reviewed, not pertinent.     Social History:  Social History     Tobacco Use    Smoking status: Never Smoker    Smokeless tobacco: Never Used   Substance and Sexual Activity    Alcohol use: Not on file    Drug use: Not on file    Sexual activity: Not on file        Review of Systems     Review of Systems   Unable to perform ROS: Mental status change      Physical Exam     Initial Vitals [11/19/18 1203]   BP Pulse Resp Temp SpO2   (!) 71/44 (!) 122 (!) 30 -- (!) 73 %      MAP       --          Physical Exam  Nursing Notes and Vital Signs Reviewed.  Physical exam is limited due to the patient's condition.   General: Patient is unresponsive to verbal and painful stimuli. Elderly. Cachectic. Frail.   Head: Atraumatic   Eyes: Pupils fixed and non-reactive.  ENT: Food noted in oropharynx. Mucous membranes are dry,   Cardiovascular: Tachycardic, peripheral  Pulses are weak.   Respiratory: Diminished BS on venting mask.  Tachypnic.   Abdominal: No distension, soft, thin.   Musculoskeletal: No deformities   Skin: Pale.   Neurological: Unresponsive to verbal and painful stimuli. Full neuro exam limited due to patient's condition         ED Course   Critical Care  Date/Time: 11/19/2018 12:50 PM  Performed by: Izabella Overton MD  Authorized by: Izabella Overton MD   Direct patient critical care time: 15 minutes  Additional history critical care time: 10 minutes  Ordering / reviewing critical care time: 5 minutes  Documentation critical care time: 5 minutes  Consult with family critical care time: 10 minutes  Total critical care time (exclusive of procedural time) : 45 minutes  Critical care was necessary to treat or prevent imminent or life-threatening deterioration of the following conditions: respiratory failure.  Critical care was time spent personally by me on the following  activities: development of treatment plan with patient or surrogate, examination of patient, obtaining history from patient or surrogate, pulse oximetry and re-evaluation of patient's condition.        ED Vital Signs:  Vitals:    18 1203   BP: (!) 71/44   Pulse: (!) 122   Resp: (!) 30   SpO2: (!) 73%   Weight: 37.9 kg (83 lb 8.9 oz)              The Emergency Provider reviewed the vital signs and test results, which are outlined above.     ED Discussion     12:13 PM: Spoke with Tim Ila (son) via phone. I explained pt's presentation in ED and formulated a plan. Pt's son states he agrees with my plan to put pt on a nasal canula and make her comfortable in ED and states he is ok with me contacting a hospice service but affirms that he does not want any aggressive txs performed in ED. Patient is a DNR.  Will contact hospice.      12:27 PM: Pt has passed in ED. TOD called.     12:31 PM: Contacted Tim Thorpe (son) concerning pt's passing.         ED Medication(s):  Medications - No data to display                          Scribe Attestation:   Scribe #1: I performed the above scribed service and the documentation accurately describes the services I performed. I attest to the accuracy of the note.     Attending:   Physician Attestation Statement for Scribe #1: I, Izabella Overton MD, personally performed the services described in this documentation, as scribed by Yash Diop, in my presence, and it is both accurate and complete.           Clinical Impression       ICD-10-CM ICD-9-CM   1. Respiratory arrest R09.2 799.1   2. Cardiac arrest I46.9 427.5   3. Aspiration into airway, initial encounter T17.908A 934.9       Disposition:   Disposition:          Izabella Overton MD  [id number]

## 2018-11-19 NOTE — ED NOTES
I telephoned the patient's son, Tim Thorpe, at [phone], to inform him that the patient had been transported here.  I confirmed that we had received a copy of her DNR order and transferred him to Dr. Overton to allow him to discuss what treatment options were available.

## 2018-11-19 NOTE — ED NOTES
I telephoned [facility] 's office and spoke with Sugar Gaines.  She stated that the death is a 's case but that the body can be released to the family's  home of choice.  I telephoned [facility] and spoke with Kadeem Hoover.  The patient was ruled out for donation due to age.  The referral number is [phone].
